# Patient Record
Sex: FEMALE | Race: WHITE | NOT HISPANIC OR LATINO | Employment: UNEMPLOYED | URBAN - METROPOLITAN AREA
[De-identification: names, ages, dates, MRNs, and addresses within clinical notes are randomized per-mention and may not be internally consistent; named-entity substitution may affect disease eponyms.]

---

## 2017-03-29 ENCOUNTER — HOSPITAL ENCOUNTER (INPATIENT)
Facility: HOSPITAL | Age: 82
LOS: 2 days | DRG: 154 | End: 2017-03-31
Attending: EMERGENCY MEDICINE | Admitting: PSYCHIATRY & NEUROLOGY
Payer: MEDICARE

## 2017-03-29 DIAGNOSIS — I63.9 CEREBROVASCULAR ACCIDENT (CVA), UNSPECIFIED MECHANISM: Primary | ICD-10-CM

## 2017-03-29 PROBLEM — N30.00 ACUTE CYSTITIS WITHOUT HEMATURIA: Status: ACTIVE | Noted: 2017-03-29

## 2017-03-29 PROBLEM — E11.22 CONTROLLED TYPE 2 DIABETES MELLITUS WITH STAGE 3 CHRONIC KIDNEY DISEASE: Status: ACTIVE | Noted: 2017-03-29

## 2017-03-29 PROBLEM — I10 HYPERTENSION: Status: ACTIVE | Noted: 2017-03-29

## 2017-03-29 PROBLEM — N18.30 CKD STAGE 3 DUE TO TYPE 1 DIABETES MELLITUS: Status: ACTIVE | Noted: 2017-03-29

## 2017-03-29 PROBLEM — E78.00 HYPERCHOLESTEROLEMIA: Status: ACTIVE | Noted: 2017-03-29

## 2017-03-29 PROBLEM — E03.9 HYPOTHYROIDISM: Status: ACTIVE | Noted: 2017-03-29

## 2017-03-29 PROBLEM — M19.90 OSTEOARTHRITIS: Status: ACTIVE | Noted: 2017-03-29

## 2017-03-29 PROBLEM — N18.30 CONTROLLED TYPE 2 DIABETES MELLITUS WITH STAGE 3 CHRONIC KIDNEY DISEASE: Status: ACTIVE | Noted: 2017-03-29

## 2017-03-29 PROBLEM — E10.22 CKD STAGE 3 DUE TO TYPE 1 DIABETES MELLITUS: Status: ACTIVE | Noted: 2017-03-29

## 2017-03-29 LAB
BACTERIA #/AREA URNS AUTO: ABNORMAL /HPF
BILIRUB UR QL STRIP: NEGATIVE
CHOLEST/HDLC SERPL: 3.6 {RATIO}
CLARITY UR REFRACT.AUTO: ABNORMAL
COLOR UR AUTO: ABNORMAL
DIASTOLIC DYSFUNCTION: YES
ESTIMATED AVG GLUCOSE: 143 MG/DL
ESTIMATED PA SYSTOLIC PRESSURE: 34.36
GLUCOSE UR QL STRIP: NEGATIVE
HBA1C MFR BLD HPLC: 6.6 %
HDL/CHOLESTEROL RATIO: 27.5 %
HDLC SERPL-MCNC: 131 MG/DL
HDLC SERPL-MCNC: 36 MG/DL
HGB UR QL STRIP: ABNORMAL
HYALINE CASTS UR QL AUTO: 0 /LPF
KETONES UR QL STRIP: NEGATIVE
LDLC SERPL CALC-MCNC: 79.4 MG/DL
LEUKOCYTE ESTERASE UR QL STRIP: ABNORMAL
MICROSCOPIC COMMENT: ABNORMAL
NITRITE UR QL STRIP: POSITIVE
NONHDLC SERPL-MCNC: 95 MG/DL
PH UR STRIP: 5 [PH] (ref 5–8)
POCT GLUCOSE: 109 MG/DL (ref 70–110)
POCT GLUCOSE: 139 MG/DL (ref 70–110)
POCT GLUCOSE: 164 MG/DL (ref 70–110)
POCT GLUCOSE: 240 MG/DL (ref 70–110)
PROT UR QL STRIP: ABNORMAL
RBC #/AREA URNS AUTO: 7 /HPF (ref 0–4)
RETIRED EF AND QEF - SEE NOTES: 50 (ref 55–65)
SP GR UR STRIP: 1.01 (ref 1–1.03)
TRICUSPID VALVE REGURGITATION: ABNORMAL
TRIGL SERPL-MCNC: 78 MG/DL
URN SPEC COLLECT METH UR: ABNORMAL
UROBILINOGEN UR STRIP-ACNC: NEGATIVE EU/DL
WBC #/AREA URNS AUTO: >100 /HPF (ref 0–5)
WBC CLUMPS UR QL AUTO: ABNORMAL

## 2017-03-29 PROCEDURE — 87086 URINE CULTURE/COLONY COUNT: CPT

## 2017-03-29 PROCEDURE — 20000000 HC ICU ROOM

## 2017-03-29 PROCEDURE — 27000221 HC OXYGEN, UP TO 24 HOURS

## 2017-03-29 PROCEDURE — 83701 LIPOPROTEIN BLD HR FRACTION: CPT

## 2017-03-29 PROCEDURE — 93306 TTE W/DOPPLER COMPLETE: CPT | Mod: 26,,, | Performed by: INTERNAL MEDICINE

## 2017-03-29 PROCEDURE — 87077 CULTURE AEROBIC IDENTIFY: CPT

## 2017-03-29 PROCEDURE — 96365 THER/PROPH/DIAG IV INF INIT: CPT

## 2017-03-29 PROCEDURE — 25000003 PHARM REV CODE 250: Performed by: NURSE PRACTITIONER

## 2017-03-29 PROCEDURE — 93880 EXTRACRANIAL BILAT STUDY: CPT | Performed by: SURGERY

## 2017-03-29 PROCEDURE — 99222 1ST HOSP IP/OBS MODERATE 55: CPT | Mod: ,,, | Performed by: PSYCHIATRY & NEUROLOGY

## 2017-03-29 PROCEDURE — 80061 LIPID PANEL: CPT

## 2017-03-29 PROCEDURE — 99233 SBSQ HOSP IP/OBS HIGH 50: CPT | Mod: GC,,, | Performed by: PSYCHIATRY & NEUROLOGY

## 2017-03-29 PROCEDURE — 99285 EMERGENCY DEPT VISIT HI MDM: CPT

## 2017-03-29 PROCEDURE — 63600175 PHARM REV CODE 636 W HCPCS: Performed by: PSYCHIATRY & NEUROLOGY

## 2017-03-29 PROCEDURE — 83036 HEMOGLOBIN GLYCOSYLATED A1C: CPT

## 2017-03-29 PROCEDURE — 82962 GLUCOSE BLOOD TEST: CPT

## 2017-03-29 PROCEDURE — 25000003 PHARM REV CODE 250: Performed by: PSYCHIATRY & NEUROLOGY

## 2017-03-29 PROCEDURE — 96361 HYDRATE IV INFUSION ADD-ON: CPT

## 2017-03-29 PROCEDURE — 81001 URINALYSIS AUTO W/SCOPE: CPT

## 2017-03-29 PROCEDURE — 63600175 PHARM REV CODE 636 W HCPCS: Performed by: EMERGENCY MEDICINE

## 2017-03-29 PROCEDURE — 99291 CRITICAL CARE FIRST HOUR: CPT | Mod: ,,, | Performed by: EMERGENCY MEDICINE

## 2017-03-29 PROCEDURE — 87186 SC STD MICRODIL/AGAR DIL: CPT

## 2017-03-29 PROCEDURE — 87088 URINE BACTERIA CULTURE: CPT

## 2017-03-29 PROCEDURE — 93306 TTE W/DOPPLER COMPLETE: CPT

## 2017-03-29 RX ORDER — GUAIFENESIN 100 MG/5ML
200 SOLUTION ORAL 3 TIMES DAILY PRN
Status: DISCONTINUED | OUTPATIENT
Start: 2017-03-29 | End: 2017-03-31 | Stop reason: HOSPADM

## 2017-03-29 RX ORDER — SODIUM CHLORIDE 9 MG/ML
1000 INJECTION, SOLUTION INTRAVENOUS CONTINUOUS
Status: ACTIVE | OUTPATIENT
Start: 2017-03-29 | End: 2017-03-30

## 2017-03-29 RX ORDER — LOPERAMIDE HYDROCHLORIDE 2 MG/1
2 CAPSULE ORAL 4 TIMES DAILY PRN
Status: DISCONTINUED | OUTPATIENT
Start: 2017-03-29 | End: 2017-03-31 | Stop reason: HOSPADM

## 2017-03-29 RX ORDER — ATENOLOL 50 MG/1
50 TABLET ORAL DAILY
Status: DISCONTINUED | OUTPATIENT
Start: 2017-03-29 | End: 2017-03-31 | Stop reason: HOSPADM

## 2017-03-29 RX ORDER — LEVOTHYROXINE SODIUM 75 UG/1
75 TABLET ORAL DAILY
Status: DISCONTINUED | OUTPATIENT
Start: 2017-03-29 | End: 2017-03-31 | Stop reason: HOSPADM

## 2017-03-29 RX ORDER — AMLODIPINE BESYLATE 10 MG/1
10 TABLET ORAL DAILY
Status: DISCONTINUED | OUTPATIENT
Start: 2017-03-29 | End: 2017-03-31 | Stop reason: HOSPADM

## 2017-03-29 RX ORDER — LIDOCAINE HYDROCHLORIDE 10 MG/ML
2.1 INJECTION INFILTRATION; PERINEURAL ONCE
Status: DISCONTINUED | OUTPATIENT
Start: 2017-03-29 | End: 2017-03-31 | Stop reason: HOSPADM

## 2017-03-29 RX ORDER — INSULIN ASPART 100 [IU]/ML
1-10 INJECTION, SOLUTION INTRAVENOUS; SUBCUTANEOUS
Status: DISCONTINUED | OUTPATIENT
Start: 2017-03-29 | End: 2017-03-31 | Stop reason: HOSPADM

## 2017-03-29 RX ORDER — IBUPROFEN 200 MG
16 TABLET ORAL
Status: DISCONTINUED | OUTPATIENT
Start: 2017-03-29 | End: 2017-03-31 | Stop reason: HOSPADM

## 2017-03-29 RX ORDER — IBUPROFEN 200 MG
24 TABLET ORAL
Status: DISCONTINUED | OUTPATIENT
Start: 2017-03-29 | End: 2017-03-31 | Stop reason: HOSPADM

## 2017-03-29 RX ORDER — TRIAMCINOLONE ACETONIDE 1 MG/G
CREAM TOPICAL 2 TIMES DAILY
Status: DISCONTINUED | OUTPATIENT
Start: 2017-03-29 | End: 2017-03-31 | Stop reason: HOSPADM

## 2017-03-29 RX ORDER — PRAVASTATIN SODIUM 20 MG/1
20 TABLET ORAL DAILY
Status: DISCONTINUED | OUTPATIENT
Start: 2017-03-29 | End: 2017-03-31 | Stop reason: HOSPADM

## 2017-03-29 RX ORDER — CEFTRIAXONE 1 G/1
1 INJECTION, POWDER, FOR SOLUTION INTRAMUSCULAR; INTRAVENOUS
Status: DISCONTINUED | OUTPATIENT
Start: 2017-03-30 | End: 2017-03-29

## 2017-03-29 RX ORDER — GLUCAGON 1 MG
1 KIT INJECTION
Status: DISCONTINUED | OUTPATIENT
Start: 2017-03-29 | End: 2017-03-31 | Stop reason: HOSPADM

## 2017-03-29 RX ORDER — CHOLECALCIFEROL (VITAMIN D3) 50 MCG
50000 TABLET ORAL DAILY
Status: DISCONTINUED | OUTPATIENT
Start: 2017-03-29 | End: 2017-03-30

## 2017-03-29 RX ORDER — ONDANSETRON 4 MG/1
4 TABLET, FILM COATED ORAL EVERY 8 HOURS PRN
Status: DISCONTINUED | OUTPATIENT
Start: 2017-03-29 | End: 2017-03-31 | Stop reason: HOSPADM

## 2017-03-29 RX ADMIN — LEVOTHYROXINE SODIUM 75 MCG: 75 TABLET ORAL at 10:03

## 2017-03-29 RX ADMIN — INSULIN ASPART 1 UNITS: 100 INJECTION, SOLUTION INTRAVENOUS; SUBCUTANEOUS at 11:03

## 2017-03-29 RX ADMIN — PRAVASTATIN SODIUM 20 MG: 20 TABLET ORAL at 02:03

## 2017-03-29 RX ADMIN — CEFTRIAXONE 1 G: 1 INJECTION, SOLUTION INTRAVENOUS at 05:03

## 2017-03-29 RX ADMIN — ATENOLOL 50 MG: 50 TABLET ORAL at 10:03

## 2017-03-29 RX ADMIN — SODIUM CHLORIDE 1000 ML: 0.9 INJECTION, SOLUTION INTRAVENOUS at 04:03

## 2017-03-29 RX ADMIN — AMLODIPINE BESYLATE 10 MG: 10 TABLET ORAL at 10:03

## 2017-03-29 NOTE — PLAN OF CARE
Problem: Patient Care Overview  Goal: Individualization & Mutuality  Past Medical History:   Diagnosis Date    Aphasia      Arthritis      Hypertension      Renal disorder      Thyroid disease         Comments:   Extra blanket and keep door closed

## 2017-03-29 NOTE — PLAN OF CARE
Problem: Patient Care Overview  Goal: Plan of Care Review  Outcome: Ongoing (interventions implemented as appropriate)  Plan of care reviewed with pt,all questions and concerns addressed.Will continue to monitor post TPA  Neuro checks and vital signs.

## 2017-03-29 NOTE — PROGRESS NOTES
Pt arrived to [facility] room 3589 Post TPA from ED dept accompanied per nurses via stretcher.Pt alert and oriented x 3,moving all extremeties.Family at bedside.No acute distress noted.

## 2017-03-29 NOTE — IP AVS SNAPSHOT
[facility]  [address]  [facility] [address]  Phone: [phone]           Patient Discharge Instructions   Our goal is to set you up for success. This packet includes information on your condition, medications, and your home care.  It will help you care for yourself to prevent having to return to the hospital.     Please ask your nurse if you have any questions.      There are many details to remember when preparing to leave the hospital. Here is what you will need to do:    1. Take your medicine. If you are prescribed medications, review your Medication List on the following pages. You may have new medications to  at the pharmacy and others that you'll need to stop taking. Review the instructions for how and when to take your medications. Talk with your doctor or nurses if you are unsure of what to do.     2. Go to your follow-up appointments. Specific follow-up information is listed in the following pages. Your may be contacted by a nurse or clinical provider about future appointments. Be sure we have all of the phone numbers to reach you. Please contact your provider's office if you are unable to make an appointment.     3. Watch for warning signs. Your doctor or nurse will give you detailed warning signs to watch for and when to call for assistance. These instructions may also include educational information about your condition. If you experience any of warning signs to your health, call your doctor.           [facility]  Unless otherwise directed by your provider, please   contact [facility], our nurse care line   that is available for 24/7 assistance.     [phone] (toll-free)     Registered nurses in the [facility]   provide: appointment scheduling, clinical advisement, health education, and other advisory services.                  ** Verify the list of medication(s) below is accurate and up to date. Carry this with you in case of  emergency. If your medications have changed, please notify your healthcare provider.             Medication List      START taking these medications        Additional Info                      amoxicillin-clavulanate 500-125mg 500-125 mg Tab   Commonly known as:  AUGMENTIN   Quantity:  10 tablet   Refills:  0   Dose:  1 tablet    Instructions:  Take 1 tablet (500 mg total) by mouth 2 (two) times daily.     Begin Date    AM    Noon    PM    Bedtime       aspirin 81 MG Chew   Refills:  0   Dose:  81 mg    Last time this was given:  81 mg on 3/31/2017  7:25 AM   Instructions:  Take 1 tablet (81 mg total) by mouth once daily.     Begin Date    AM    Noon    PM    Bedtime         CHANGE how you take these medications        Additional Info                      tramadol 50 mg tablet   Commonly known as:  ULTRAM   Quantity:  30 tablet   Refills:  0   Dose:  50 mg   What changed:    - when to take this  - reasons to take this    Instructions:  Take 1 tablet (50 mg total) by mouth 3 (three) times daily as needed for Pain.     Begin Date    AM    Noon    PM    Bedtime         CONTINUE taking these medications        Additional Info                      amlodipine 10 MG tablet   Commonly known as:  NORVASC   Refills:  0   Dose:  10 mg    Last time this was given:  10 mg on 3/31/2017  7:25 AM   Instructions:  Take 10 mg by mouth once daily.     Begin Date    AM    Noon    PM    Bedtime       atenolol 50 MG tablet   Commonly known as:  TENORMIN   Refills:  0   Dose:  50 mg    Last time this was given:  50 mg on 3/31/2017  7:26 AM   Instructions:  Take 50 mg by mouth once daily.     Begin Date    AM    Noon    PM    Bedtime       colestipol 5 gram granules   Commonly known as:  COLESTID   Refills:  0   Dose:  1 g    Instructions:  Take 1 g by mouth once daily.     Begin Date    AM    Noon    PM    Bedtime       glipiZIDE 5 MG Tr24   Commonly known as:  GLUCOTROL   Refills:  0   Dose:  5 mg    Instructions:  Take 5 mg by mouth  daily with breakfast.     Begin Date    AM    Noon    PM    Bedtime       guaifenesin 100 mg/5 ml 100 mg/5 mL syrup   Commonly known as:  ROBITUSSIN   Refills:  0   Dose:  200 mg    Instructions:  Take 200 mg by mouth 3 (three) times daily as needed for Cough.     Begin Date    AM    Noon    PM    Bedtime       insulin lispro 100 unit/mL injection   Commonly known as:  HUMALOG   Refills:  0    Instructions:  Inject into the skin 3 (three) times daily before meals. SLIDING SCALE: < 60 GIVE 1 AMP GLUCAGON AND NOTIFY MD,  NONE, 150-200: 4 UNITS, 201-250: 6 UNITS, 251-300: 8 UNITS, >300: 10 UNITS AND NOTIFY MD     Begin Date    AM    Noon    PM    Bedtime       levothyroxine 75 MCG tablet   Commonly known as:  SYNTHROID   Refills:  0   Dose:  75 mcg    Last time this was given:  75 mcg on 3/31/2017  7:25 AM   Instructions:  Take 75 mcg by mouth once daily.     Begin Date    AM    Noon    PM    Bedtime       loperamide 2 mg capsule   Commonly known as:  IMODIUM   Refills:  0   Dose:  2 mg    Instructions:  Take 2 mg by mouth 4 (four) times daily as needed for Diarrhea.     Begin Date    AM    Noon    PM    Bedtime       ondansetron 4 MG tablet   Commonly known as:  ZOFRAN   Refills:  0   Dose:  4 mg    Instructions:  Take 4 mg by mouth every 8 (eight) hours as needed for Nausea.     Begin Date    AM    Noon    PM    Bedtime       pravastatin 20 MG tablet   Commonly known as:  PRAVACHOL   Refills:  0   Dose:  20 mg    Last time this was given:  20 mg on 3/31/2017  7:25 AM   Instructions:  Take 20 mg by mouth once daily.     Begin Date    AM    Noon    PM    Bedtime       SITagliptan 50 MG Tab   Commonly known as:  JANUVIA   Refills:  0   Dose:  100 mg    Instructions:  Take 100 mg by mouth once daily.     Begin Date    AM    Noon    PM    Bedtime       triamcinolone acetonide 0.1% 0.1 % cream   Commonly known as:  KENALOG   Refills:  0    Last time this was given:  1 oz on 3/31/2017  7:27 AM   Instructions:  Apply  topically 2 (two) times daily.     Begin Date    AM    Noon    PM    Bedtime       VITAMIN D-3 ORAL   Refills:  0   Dose:  90718 Units    Instructions:  Take 50,000 Units by mouth.     Begin Date    AM    Noon    PM    Bedtime            Where to Get Your Medications      You can get these medications from any pharmacy     Bring a paper prescription for each of these medications     amoxicillin-clavulanate 500-125mg 500-125 mg Tab    tramadol 50 mg tablet       You don't need a prescription for these medications     aspirin 81 MG Chew                  Please bring to all follow up appointments:    1. A copy of your discharge instructions.  2. All medicines you are currently taking in their original bottles.  3. Identification and insurance card.    Please arrive 15 minutes ahead of scheduled appointment time.    Please call 24 hours in advance if you must reschedule your appointment and/or time.        Follow-up Information     Follow up with Lonny Andujar MD In 1 week.    Specialty:  Family Medicine    Contact information:    [address] [place] [id number]  [phone]          Discharge Instructions     Future Orders    Activity as tolerated         Primary Diagnosis     Your primary diagnosis was:  Facial Weakness      Admission Information     Date & Time Provider Department CSN    3/29/2017  4:23 AM Lonny Escobedo MD [facility] [id number]      Care Providers     Provider Role Specialty Primary office phone    Lonny Escobedo MD Attending Provider Neurology [phone]    John Tan MD Team Attending  Neuro Critical Care [phone]    Gurwinder Lanza MD Team Attending  Neuro Critical Care [phone]    Jake Jones MD Team Attending  Interventional Neurology [phone]    Lonny Escobedo MD Team Attending  Neurology [phone]      Your Vitals Were     BP Pulse Temp Resp Height Weight    129/61 (BP Location: Right arm,  "Patient Position: Lying, BP Method: Automatic) 74 97.3 °F (36.3 °C) (Oral) 20 5' 2" (1.575 m) 54.4 kg (120 lb)    Last Period SpO2 BMI          (LMP Unknown) 98% 21.95 kg/m2        Recent Lab Values        3/29/2017                           5:49 AM           A1C 6.6 (H)           Comment for A1C at  5:49 AM on 3/29/2017:  According to ADA guidelines, hemoglobin A1C <7.0% represents  optimal control in non-pregnant diabetic patients.  Different  metrics may apply to specific populations.   Standards of Medical Care in Diabetes - 2016.  For the purpose of screening for the presence of diabetes:  <5.7%     Consistent with the absence of diabetes  5.7-6.4%  Consistent with increasing risk for diabetes   (prediabetes)  >or=6.5%  Consistent with diabetes  Currently no consensus exists for use of hemoglobin A1C  for diagnosis of diabetes for children.        Allergies as of 3/31/2017     No Known Allergies      Advance Directives     An advance directive is a document which, in the event you are no longer able to make decisions for yourself, tells your healthcare team what kind of treatment you do or do not want to receive, or who you would like to make those decisions for you.  If you do not currently have an advance directive, [facility] encourages you to create one.  For more information call:  [phone] ([phone]), [phone] ([phone]),  or log on to [email or web address].        Language Assistance Services     ATTENTION: Language assistance services are available, free of charge. Please call [phone].      ATENCIÓN: Si habla español, tiene a wan disposición servicios gratuitos de asistencia lingüística. Llame al [phone].     CHÚ Ý: N?u b?n nói Ti?ng Vi?t, có các d?ch v? h? tr? ngôn ng? mi?n phí dành cho b?n. G?i s? [phone].        Stroke Education              Heart Failure Education       Heart Failure: Being Active  You have a condition called heart failure. Being active doesnt " mean that you have to wear yourself out. Even a little movement each day helps to strengthen your heart. If you cant get out to exercise, you can do simple stretching and strengthening exercises at home. These are good ways to keep you well-conditioned and prevent you and your heart from becoming excessively weak.    Ideas to get you started  · Add a little movement to things you do now. Walk to mail letters. Park your car at the far end of the parking lot and walk to the store. Walk up a flight of stairs instead of taking the elevator.  · Choose activities you enjoy. You might walk, swim, or ride an exercise bike. Things like gardening and washing the car count, too. Other possibilities include: washing dishes, walking the dog, walking around the mall, and doing aerobic activities with friends.  · Join a group exercise program at a [facility] or [facility], a senior center, or a community center. Or look into a hospital cardiac rehabilitation program. Ask your doctor if you qualify.  Tips to keep you going  · Get up and get dressed each day. Go to a coffee shop and read a newspaper or go somewhere that you'll be in the presence of other active people. Youll feel more like being active.  · Make a plan. Choose one or more activities that you enjoy and that you can easily do. Then plan to do at least one each day. You might write your plan on a calendar.  · Go with a friend or a group if you like company. This can help you feel supported and stay motivated, too.  · Plan social events that you enjoy. This will keep you mentally engaged as well as physically motivated to do things you find pleasure in.  For your safety  · Talk with your healthcare provider before starting an exercise program.  · Exercise indoors when its too hot or too cold outside, or when the air quality is poor. Try walking at a shopping mall.  · Wear socks and sturdy shoes to maintain your balance and prevent falls.  · Start slowly. Do a few minutes several  times a day at first. Increase your time and speed little by little.  · Stop and rest whenever you feel tired or get short of breath.  · Dont push yourself on days when you dont feel well.  Date Last Reviewed: 3/20/2016  © 0389-8248 [employer]. [address] [place] [address]. All rights reserved. This information is not intended as a substitute for professional medical care. Always follow your healthcare professional's instructions.              Heart Failure: Evaluating Your Heart  You have a condition called heart failure. To evaluate your condition, your doctor will examine you, ask questions, and do some tests. Along with looking for signs of heart failure, the doctor looks for any other health problems that may have led to heart failure. The results of your evaluation will help your doctor form a treatment plan.  Health history and physical exam  Your visit will start with a health history. Tell the doctor about any symptoms youve noticed and about all medicines you take. Then youll have a physical exam. This includes listening to your heartbeat and breathing. Youll also be checked for swelling (edema) in your legs and neck. When you have fluid buildup or fluid in the lungs, it may be called congestive heart failure.  Diagnosing heart failure     During an echocardiogram, sound waves bounce off the heart. These are converted into a picture on the screen.   The following may be done to help your doctor form a diagnosis:  · X-rays show the size and shape of your heart. These pictures can also show fluid in your lungs.  · An electrocardiogram (ECG or EKG) shows the pattern of your heartbeat. Small pads (electrodes) are placed on your chest, arms, and legs. Wires connect the pads to the ECG machine, which records your hearts electrical signals. This can give the doctor information about heart function.  · An echocardiogram uses ultrasound waves to show the structure and movement of  your heart muscle. This shows how well the heart pumps. It also shows the thickness of the heart walls, and if the heart is enlarged. It is one of the most useful, non-invasive tests as it provides information about the heart's general function. This helps your doctor make treatment decisions.  · Lab tests evaluate small amounts of blood or urine for signs of problems. A BNP lab test can help diagnose and evaluate heart failure. BNP stands for B-type natriuretic peptide. The ventricles secrete more BNP when heart failure worsens. Lab tests can also provide information about metabolic dysfunction or heart dysfunction.  Your treatment plan  Based on the results of your evaluation and tests, your doctor will develop a treatment plan. This plan is designed to relieve some of your heart failure symptoms and help make you more comfortable. Your treatment plan may include:  · Medicine to help your heart work better and improve your quality of life  · Changes in what you eat and drink to help prevent fluid from backing up in your body  · Daily monitoring of your weight and heart failure symptoms to see how well your treatment plan is working  · Exercise to help you stay healthy  · Help with quitting smoking  · Emotional and psychological support to help adjust to the changes  · Referrals to other specialists to make sure you are being treated comprehensively  Date Last Reviewed: 3/21/2016  © 0279-4852 [employer]. [address] [place] [address], [place] [address]. All rights reserved. This information is not intended as a substitute for professional medical care. Always follow your healthcare professional's instructions.              Heart Failure: Making Changes to Your Diet  You have a condition called heart failure. When you have heart failure, excess fluid is more likely to build up in your body because your heart isn't working well. This makes the heart work harder to pump blood. Fluid buildup causes symptoms  such as shortness of breath and swelling (edema). This is often referred to as congestive heart failure or CHF. Controlling the amount of salt (sodium) you eat may help stop fluid from building up. Your doctor may also tell you to reduce the amount of fluid you drink.  Reading food labels    Your healthcare provider will tell you how much sodium you can eat each day. Read food labels to keep track. Keep in mind that certain foods are high in salt. These include canned, frozen, and processed foods. Check the amount of sodium in each serving. Watch out for high-sodium ingredients. These include MSG (monosodium glutamate), baking soda, and sodium phosphate.   Eating less salt  Give yourself time to get used to eating less salt. It may take a little while. Here are some tips to help:  · Take the saltshaker off the table. Replace it with salt-free herb mixes and spices.  · Eat fresh or plain frozen vegetables. These have much less salt than canned vegetables.  · Choose low-sodium snacks like sodium-free pretzels, crackers, or air-popped popcorn.  · Dont add salt to your food when youre cooking. Instead, season your foods with pepper, lemon, garlic, or onion.  · When you eat out, ask that your food be cooked without added salt.  · Avoid eating fried foods as these often have a great deal of salt.  If youre told to limit fluids  You may need to limit how much fluid you have to help prevent swelling. This includes anything that is liquid at room temperature, such as ice cream and soup. If your doctor tells you to limit fluid, try these tips:  · Measure drinks in a measuring cup before you drink them. This will help you meet daily goals.  · Chill drinks to make them more refreshing.  · Suck on frozen lemon wedges to quench thirst.  · Only drink when youre thirsty.  · Chew sugarless gum or suck on hard candy to keep your mouth moist.  · Weigh yourself daily to know if your body's fluid content is rising.  My sodium  goal  Your healthcare provider may give you a sodium goal to meet each day. This includes sodium found in food as well as salt that you add. My goal is to eat no more than ___________ mg of sodium per day.     When to call your doctor  Call your doctor right away if you have any symptoms of worsening heart failure. These can include:  · Sudden weight gain  · Increased swelling of your legs or ankles  · Trouble breathing when youre resting or at night  · Increase in the number of pillows you have to sleep on  · Chest pain, pressure, discomfort, or pain in the jaw, neck, or back   Date Last Reviewed: 3/21/2016  © 7348-6305 [employer]. [address] [place] [address], [place] [address]. All rights reserved. This information is not intended as a substitute for professional medical care. Always follow your healthcare professional's instructions.              Heart Failure: Medicines to Help Your Heart    You have a condition called heart failure (also known as congestive heart failure, or CHF). Your doctor will likely prescribe medicines for heart failure and any underlying health problems you have. Most heart failure patients take one or more types of medicinen. Your healthcare provider will work to find the combination of medicines that works best for you.  Heart failure medicines  Here are the most common heart failure medicines:  · ACE inhibitors lower blood pressure and decrease strain on the heart. This makes it easier for the heart to pump. Angiotensin receptor blockers have similar effects. These are prescribed for some patients instead of ACE inhibitors.  · Beta-blockers relieve stress on the heart. They also improve symptoms. They may also improve the heart's pumping action over time.  · Diuretics (also called water pills) help rid your body of excess water. This can help rid your body of swelling (edema). Having less fluid to pump means your heart doesnt have to work as hard. Some diuretics make your  body lose a mineral called potassium. Your doctor will tell you if you need to take supplements or eat more foods high in potassium.  · Digoxin helps your heart pump with more strength. This helps your heart pump more blood with each beat. So, more oxygen-rich blood travels to the rest of the body.  · Aldosterone antagonists help alter hormones and decrease strain on the heart.  · Hydralazine and nitrates are two separate medicines used together to treat heart failure. They may come in one combination pill. They lower blood pressure and decrease how hard the heart has to pump.  Medicines for related conditions  Controlling other heart problems helps keep heart failure under control, too. Depending on other heart problems you have, medicines may be prescribed to:  · Lower blood pressure (antihypertensives).  · Lower cholesterol levels (statins).  · Prevent blood clots (anticoagulants or aspirin).  · Keep the heartbeat steady (antiarrhythmics).  Date Last Reviewed: 3/5/2016  © 1093-2822 [employer]. [address] [place] [address], [place] [address]. All rights reserved. This information is not intended as a substitute for professional medical care. Always follow your healthcare professional's instructions.              Heart Failure: Procedures That May Help    The heart is a muscle that pumps oxygen-rich blood to all parts of the body. When you have heart failure, the heart is not able to pump as well as it should. Blood and fluid may back up into the lungs (congestive heart failure), and some parts of the body dont get enough oxygen-rich blood to work normally. These problems lead to the symptoms of heart failure.     Certain procedures may help the heart pump better in some cases of heart failure. Some procedures are done to treat health problems that may have caused the heart failure such as coronary artery disease or heart rhythm problems. For more serious heart failure, other options are  available.  Treating artery and valve problems  If you have coronary artery disease or valve disease, procedures may be done to improve blood flow. This helps the heart pump better, which can improve heart failure symptoms. First, your doctor may do a cardiac catheterization to help detect clogged blood vessels or valve damage. During this procedure, a  thin tube (catheter) in inserted into a blood vessel and guided to the heart. There a dye is injected and a special type of X-ray (angiogram) is taken of the blood vessels. Procedures to open a blocked artery or fix damaged valves can also be done using catheterization.  · Angioplasty uses a balloon-tipped instrument at the end of the catheter. The balloon is inflated to widen the narrowed artery. In many cases, a stent is expanded to further support the narrowed artery. A stent is a metal mesh tube.  · Valve surgery repairs or replacement of faulty valves can also be done during catheterization so blood can flow properly through the chambers of the heart.  Bypass surgery is another option to help treat blocked arteries. It uses a healthy blood vessel from elsewhere in the body. The healthy blood vessel is attached above and below the blocked area so that blood can flow around the blocked artery.  Treating heart rhythm problems  A device may be placed in the chest to help a weak heart maintain a healthy, heartbeat so the heart can pump more effectively:  · Pacemaker. A pacemaker is an implanted device that regulates your heartbeat electronically. It monitors your heart's rhythm and generates a painless electric impulse that helps the heart beat in a regular rhythm. A pacemaker is programmed to meet your specific heart rhythm needs.  · Biventricular pacing/cardiac resynchronization therapy. A type of pacemaker that paces both pumping chambers of the heart at the same time to coordinate contractions and to improve the heart's function. Some people with heart failure  are candidates for this therapy.  · Implantable cardioverter defibrillator. A device similar to a pacemaker that senses when the heart is beating too fast and delivers an electrical shock to convert the fast rhythm to a normal rhythm. This can be a life saving device.  In severe cases  In more serious cases of heart failure when other treatments no longer work, other options may include:  · Ventricular assist devices (VADs). These are mechanical devices used to take over the pumping function for one or both of the heart's ventricles, or pumping chambers. A VAD may be necessary when heart failure progresses to the point that medicines and other treatments no longer help. In some cases, a VAD may be used as a bridge to transplant.  · Heart transplant. This is replacing the diseased heart with a healthy one from a donor. This is an option for a few people who are very sick. A heart transplant is very serious and not an option for all patients. Your doctor can tell you more.  Date Last Reviewed: 3/20/2016  © 2969-4308 [employer]. [address] [place] [address]. All rights reserved. This information is not intended as a substitute for professional medical care. Always follow your healthcare professional's instructions.              Heart Failure: Tracking Your Weight  You have a condition called heart failure. When you have heart failure, a sudden weight gain or a steady rise in weight is a warning sign that your body is retaining too much water and salt. This could mean your heart failure is getting worse. If left untreated, it can cause problems for your lungs and result in shortness of breath. Weighing yourself each day is the best way to know if youre retaining water. If your weight goes up quickly, call your doctor. You will be given instructions on how to get rid of the excess water. You will likely need medicines and to avoid salt. This will help your heart work better.  Call your doctor  if you gain more than 2 pounds in 1 day, more than 5 pounds in 1 week, or whatever weight gain you were told to report by your doctor. This is often a sign of worsening heart failure and needs to be evaluated and treated. Your doctor will tell you what to do next.   Tips for weighing yourself    · Weigh yourself at the same time each morning, wearing the same clothes. Weigh yourself after urinating and before eating.  · Use the same scale each day. Make sure the numbers are easy to read. Put the scale on a flat, hard surface -- not on a rug or carpet.  · Do not stop weighing yourself. If you forget one day, weigh again the next morning.  How to use your weight chart  · Keep your weight chart near the scale. Write your weight on the chart as soon as you get off the scale.  · Fill in the month and the start date on the chart. Then write down your weight each day. Your chart will look like this:    · If you miss a day, leave the space blank. Weigh yourself the next day and write your weight in the next space.  · Take your weight chart with you when you go to see your doctor.  Date Last Reviewed: 3/20/2016  © 0773-9780 [employer]. [address] [place] [address], [place] [address]. All rights reserved. This information is not intended as a substitute for professional medical care. Always follow your healthcare professional's instructions.              Heart Failure: Warning Signs of a Flare-Up  You have a condition called heart failure. Once you have heart failure, flare-ups can happen. Below are signs that can mean your heart failure is getting worse. If you notice any of these warning signs, call your healthcare provider.  Swelling    · Your feet, ankles, or lower legs get puffier.  · You notice skin changes on your lower legs.  · Your shoes feel too tight.  · Your clothes are tighter in the waist.  · You have trouble getting rings on or off your fingers.  Shortness of breath  · You have to breathe harder even  when youre doing your normal activities or when youre resting.  · You are short of breath walking up stairs or even short distances.  · You wake up at night short of breath or coughing.  · You need to use more pillows or sit up to sleep.  · You wake up tired or restless.  Other warning signs  · You feel weaker, dizzy, or more tired.  · You have chest pain or changes in your heartbeat.  · You have a cough that wont go away.  · You cant remember things or dont feel like eating.  Tracking your weight  Gaining weight is often the first warning sign that heart failure is getting worse. Gaining even a few pounds can be a sign that your body is retaining excess water and salt. Weighing yourself each day in the morning after you urinate and before you eat, is the best way to know if you're retaining water. Get a scale that is easy to read and make sure you wear the same clothes and use the same scale every time you weigh. Your healthcare provider will show you how to track your weight. Call your doctor if you gain more than 2 pounds in 1 day, 5 pounds in 1 week, or whatever weight gain you were told to report by your doctor. This is often a sign of worsening heart failure and needs to be evaluated and treated before it compromises your breathing. Your doctor will tell you what to do next.    Date Last Reviewed: 3/15/2016  © 3877-2502 [employer]. [address] [place] [address]. All rights reserved. This information is not intended as a substitute for professional medical care. Always follow your healthcare professional's instructions.              Chronic Kindey Disease Education             Diabetes Discharge Instructions                                   [facility] Sign-Up     Activating your [facility] account is as easy as 1-2-3!     1) Visit [email or web address], select Sign Up Now, enter this activation code and your date of birth, then select Next.  [email or web address]  Expires: 5/15/2017 12:50 PM       2) Create a username and password to use when you visit [facility] in the future and select a security question in case you lose your password and select Next.    3) Enter your e-mail address and click Sign Up!    Additional Information  If you have questions, please e-mail [email or web address] or call [phone] to talk to our [facility] staff. Remember, [facility] is NOT to be used for urgent needs. For medical emergencies, dial 911.          [facility] complies with applicable Federal civil rights laws and does not discriminate on the basis of race, color, national origin, age, disability, or sex.

## 2017-03-29 NOTE — H&P
[facility]  History & Physical  Neurological ICU    SUBJECTIVE:     Chief Complaint/Reason for Admission: facial weakness, s/p tpa    History of Present Illness: 89 y.o. Nursing home patient who developed left facial weakness and slurred speech about an hour prior to arrival at a local ed  Patient was given tpa and sent to [facility]      (Not in a hospital admission)    Review of patient's allergies indicates:  No Known Allergies    Past Medical History:   Diagnosis Date    Aphasia     Arthritis     Hypertension     Renal disorder     Thyroid disease      No past surgical history on file.  No family history on file.  Social History   Substance Use Topics    Smoking status: Never Smoker    Smokeless tobacco: None    Alcohol use No        Review of Systems:  Review of systems not obtained due to patient factors dementia.    OBJECTIVE:     Vital Signs (Most Recent):  Temp: 98 °F (36.7 °C) (03/29/17 0421)  Pulse: 90 (03/29/17 0438)  Resp: 16 (03/29/17 0438)  BP: (!) 143/63 (03/29/17 0438)  SpO2: 100 % (03/29/17 0438)  Ventilator Data (Last 24H):          Hemodynamic Parameters (Last 24H):       Physical Exam:  General: appears stated age  Lungs:  clear to auscultation bilaterally  Heart: regular rate and rhythm  Abdomen: soft, non-tender non-distented; bowel sounds normal; no masses,  no organomegaly  Pulses: 2+ peripheral pulses  Neurologic: Mental status: alertness: alert, speech dysarthric possibly due to lower jaw edentulous  Cranial nerves: normal, left lower face asymmetry  Motor:grossly normal    Lines/Drains:       Peripheral IV - Single Lumen 03/29/17 0154 Left Wrist (Active)   Site Assessment Clean;Dry;Intact 3/29/2017  1:54 AM   Number of days:0            Peripheral IV - Single Lumen 03/29/17 0158 Right Wrist (Active)   Site Assessment Clean;Dry;Intact 3/29/2017  1:58 AM   Line Status Blood return noted;Flushed 3/29/2017  1:58 AM   Number of days:0            Urethral  Catheter 03/29/17 0252 16 Fr. (Active)   Number of days:0       Laboratory:  CBC:   Recent Labs  Lab 03/29/17 0203   WBC 12.50   RBC 3.17*   HGB 9.6*   HCT 29.3*      MCV 92   MCH 30.2   MCHC 32.7     BMP:   Recent Labs  Lab 03/29/17 0203   *      K 4.7      CO2 23   BUN 47*   CREATININE 2.10*   CALCIUM 9.2     CMP:   Recent Labs  Lab 03/29/17 0203   *   CALCIUM 9.2   ALBUMIN 3.7   PROT 7.1      K 4.7   CO2 23      BUN 47*   CREATININE 2.10*   ALKPHOS 83   ALT 18   AST 16   BILITOT 0.5     LFTs:   Recent Labs  Lab 03/29/17 0203   ALT 18   AST 16   ALKPHOS 83   BILITOT 0.5   PROT 7.1   ALBUMIN 3.7       Chest X-Ray:     Diagnostic Results:  CT: head reviewed    ASSESSMENT/PLAN:         Plan:  Patient with possible small vessel event post tpa  Sbp< 180  q1 hr neuro check/bp check  Restart home bp meds if passes [place], start statin  Sliding scale then add scheduled regimen as po improves  Stroke workup    Critical Care Time greater than: initial visit 2

## 2017-03-29 NOTE — CONSULTS
[facility]  Vascular Neurology  Comprehensive Stroke Center  Consult Note      Inpatient consult to Vascular (Stroke) Neurology  Consult performed by: VENKATESH SCHERER  Consult ordered by: JETT ENG  Reason for consult: facial droop, dysarthria        Subjective:     History of Present Illness:  89 y/o female who resides at [facility] in [place] started with left facial droop and jaw pain and dysarthria. She was taken to [facility] and with no resolution of symptoms  Telemedicine call with Dr Jewell was done and with neg CT scan recommendation was for IVtPA to be given which is was and patient was transferred to [facility].   Upon arrival patient still with dysarthria and left facial droop and pain to jaw area. Patient states that she has bottom dentures but did not put them in as it hurt too much and left face was full.   She denies any extremity weakness, sensory deficit, visual deficit.   Risk factors HTN, HLP, DM,            Past Medical History:   Diagnosis Date    Aphasia     Arthritis     Hypertension     Renal disorder     Thyroid disease      No past surgical history on file.  No family history on file.  Social History   Substance Use Topics    Smoking status: Never Smoker    Smokeless tobacco: None    Alcohol use No     Review of patient's allergies indicates:  No Known Allergies  Medications: I have reviewed the current medication administration record.      (Not in a hospital admission)    Review of Systems   Constitutional: Negative for chills and fever.   HENT: Negative for ear discharge and ear pain.         Haw pain   Eyes: Negative for pain and redness.   Respiratory: Negative for cough and shortness of breath.    Gastrointestinal: Negative for abdominal distention and constipation.   Endocrine: Positive for cold intolerance. Negative for polyuria.   Genitourinary: Negative for difficulty urinating and dysuria.   Musculoskeletal: Positive  for arthralgias. Negative for back pain.   Skin: Negative for rash and wound.        Dry skin   Neurological: Positive for facial asymmetry and speech difficulty.   Psychiatric/Behavioral: Negative for agitation and confusion.     Objective:     Vital Signs (Most Recent):  Temp: 98 °F (36.7 °C) (03/29/17 0421)  Pulse: 90 (03/29/17 0438)  Resp: 16 (03/29/17 0438)  BP: (!) 143/63 (03/29/17 0438)  SpO2: 100 % (03/29/17 0438)    Vital Signs Range (Last 24H):  Temp:  [98 °F (36.7 °C)]   Pulse:  [86-92]   Resp:  [16-20]   BP: (143-180)/(63-83)   SpO2:  [96 %-100 %]     Physical Exam   Constitutional: She is oriented to person, place, and time. She appears well-developed and well-nourished.   HENT:   Head: Normocephalic and atraumatic.   Missing bottom dentures   Eyes: EOM are normal. Pupils are equal, round, and reactive to light.   Neck: Normal range of motion.   Cardiovascular: Normal rate.    Pulmonary/Chest: Effort normal.   Abdominal: Soft.   Genitourinary:   Genitourinary Comments: Cloud urine in catheter   Musculoskeletal: Normal range of motion.   Neurological: She is alert and oriented to person, place, and time. GCS eye subscore is 4 - spontaneous. GCS verbal subscore is 5 - oriented. GCS motor subscore is 6 - obeys commands.   Skin: Skin is warm and dry.   Tenting of skin       Neurological Exam:   LOC: alert and follows requests  Language: No aphasia  Speech: Dysarthria  Orientation: Person, Place, Time  Memory: Recent memory intact, Remote memory intact, Age correct, Month correct  Visual Fields (CN II): Full  EOM (CN III, IV, VI): Full/intact  Oculocephalics: normal  Pupils (CN III, IV, VI): PERRL  Facial Sensation (CN V): Symmetric, Corneal reflex intact  Facial Movement (CN VII): lower weakness left lower  Hearing (CN VIII): intact bilaterally  Gag Reflex (CN IX, X): normal/symmetric  Shoulder/Neck (CN XI): SCM-Left: Normal ; SCM-Right: Normal ; Shoulder Shrug: Normal/Symetric  Tongue (CN XII): to  midline  Reflexes: flexor plantar responses bilaterally  Motor*: Arm Left:  Normal (5/5), Leg Left:   Normal (5/5), Arm Right:   Normal (5/5), Leg Right:   Normal (5/5)  Cerebellar*: Not testable due to laying on stretcher post tpa  Sensation: intact to light touch, temperature and vibration  Tone: Arm-Left: normal; Leg-Left: normal; Arm-Right: normal; Leg-Right: normal    Stroke Team Times:   Date and Time Taken: 3/29/2017 5:19 AM  Last Known Normal Date and Time : 3/29/801338:00  Symptom Onset Date and Time:3/29/813357:00  Stroke Team Called Date and Time: 3/29/072970:31  Stroke Team Arrived Date and Time: 3/29/2017  CT Interpretation Time: :  Intervention Time: 02:53 (TPA)  NIH Stroke Scale:  Interval: 1 hour post tPA or endovascular procedure completion  Level of Consciousness: 0 - alert  LOC Questions: 0 - answers both correctly  LOC Commands: 0 - performs both correctly  Best Gaze: 0 - normal  Visual: 0 - no visual loss  Facial Palsy: 1 - minor  Motor Left Arm: 0 - no drift  Motor Right Arm: 0 - no drift  Motor Left Le - no drift  Motor Right Le - no drift  Limb Ataxia: 0 - absent  Sensory: 0 - normal  Best Language: 0 - no aphasia  Dysarthria: 1 - mild to moderate dysarthria  Extinction and Inattention: 0 - no neglect  NIH Stroke Scale Total: 2  [place] Coma Scale:  Best Eye Response: 4 - spontaneous  Best Motor Response: 6 - obeys commands  Best Verbal Response: 5 - oriented  [place] Coma Scale Total: 15  Modified [place] Scale:   Timeline: Prior to symptoms onset  Modified Elizabeth Score: 2 - slight disability        Laboratory:  CMP:   Recent Labs  Lab 17  0203   CALCIUM 9.2   ALBUMIN 3.7   PROT 7.1      K 4.7   CO2 23      BUN 47*   CREATININE 2.10*   ALKPHOS 83   ALT 18   AST 16   BILITOT 0.5     CBC:   Recent Labs  Lab 17  0203   WBC 12.50   RBC 3.17*   HGB 9.6*   HCT 29.3*      MCV 92   MCH 30.2   MCHC 32.7     Lipid Panel: No results for input(s): CHOL, LDLCALC,  HDL, TRIG in the last 168 hours.  Coagulation:   Recent Labs  Lab 03/29/17  0203   INR 1.0     Platelet Aggregation Study: No results for input(s): PLTAGG, PLTAGINTERP, PLTAGREGLACO, ADPPLTAGGREG in the last 168 hours.  Hgb A1C: No results for input(s): HGBA1C in the last 168 hours.  TSH: No results for input(s): TSH in the last 168 hours.     Recent Labs  Lab 03/29/17  0443   COLORU [place]   SPECGRAV 1.010   PHUR 5.0   PROTEINUA 1+*   BACTERIA Many*         Diagnostic Results:  Brain Imaging:  CT head w/o contrast 3-29-17 results:  No hemmorhage. No mass effect. No early infarct signs    Cardiac Evaluation:  EKG/Telemetry:    Assessment/Plan:     89 y/o female with left facial droop, dysarthira and jaw pain. Received IVtPA at [facility]. 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 Yes. Recieved at [place]      Interventional Revascularization Candidate?  No; No significant neurological deficit    Research Candidate? No      Shaneka Valencia NP  [facility]  Department of Vascular Neurology   [facility]

## 2017-03-29 NOTE — ED TRIAGE NOTES
89 year old female presents to the ED for neurology evaluation after sudden onset facial droop and slurred speech. Patient received [facility] and was flown here for stroke evaluation. Patient is a resident of [facility] in [place]

## 2017-03-29 NOTE — ED PROVIDER NOTES
Encounter Date: 3/29/2017    SCRIBE #1 NOTE: I, Su Rothman, am scribing for, and in the presence of,  Dr. Mendoza. I have scribed the entire note.       History     Chief Complaint   Patient presents with    Cerebrovascular Accident     Review of patient's allergies indicates:  No Known Allergies  HPI Comments: Time patient was seen by the provider: 4:22 AM      The patient is a 89 y.o. female that presents to the ED in transfer after presenting to outside facility with left facial weakness and speech difficulty which began at approximately 1:10 AM. No focal extremity weakness or paresthesias. Telemedicine consult obtained and decision to administer tPA was made. Patient received tPA prior to transfer to [facility] for definitive neurologic evaluation and management. There are no known co-morbidities at this time.     The history is provided by the patient, the EMS personnel and medical records.     Past Medical History:   Diagnosis Date    Aphasia     Arthritis     Hypertension     Renal disorder     Thyroid disease      History reviewed. No pertinent surgical history.  History reviewed. No pertinent family history.  Social History   Substance Use Topics    Smoking status: Never Smoker    Smokeless tobacco: None    Alcohol use No     Review of Systems   Constitutional: Negative for chills.   HENT: Negative for congestion.    Eyes: Negative for redness.   Respiratory: Negative for cough and shortness of breath.    Cardiovascular: Negative for chest pain.   Gastrointestinal: Negative for abdominal pain and vomiting.   Genitourinary: Negative for difficulty urinating and dysuria.   Musculoskeletal: Negative for back pain and neck pain.   Skin: Negative for rash.   Neurological: Positive for facial asymmetry (left droop), speech difficulty and weakness (left facial).       Physical Exam   Initial Vitals   BP Pulse Resp Temp SpO2   -- -- -- -- --            Physical Exam    Nursing note and vitals  reviewed.  Constitutional:   Weak-appearing.    HENT:   Head: Normocephalic and atraumatic.   Eyes: EOM are normal.   Neck: Normal range of motion. Neck supple.   Cardiovascular: Normal rate, regular rhythm, normal heart sounds and intact distal pulses.   Pulmonary/Chest: Breath sounds normal. No stridor. No respiratory distress. She has no wheezes. She has no rhonchi. She has no rales.   Abdominal: Soft. There is no tenderness. There is no rebound and no guarding.   Musculoskeletal: Normal range of motion.   Neurological:   Awake, alert and following commands. Slurring of speech but interacts appropriately. Left facial droop. Some dysarthria. Moving all extremities. Nonfocal extremity exam. No sensory deficits.    Skin: Skin is warm and dry.   Psychiatric: She has a normal mood and affect. Her behavior is normal. Judgment and thought content normal.         ED Course   Procedures  Labs Reviewed   URINALYSIS - Abnormal; Notable for the following:        Result Value    Appearance, UA Cloudy (*)     Protein, UA 1+ (*)     Occult Blood UA 1+ (*)     Nitrite, UA Positive (*)     Leukocytes, UA 3+ (*)     All other components within normal limits   HEMOGLOBIN A1C - Abnormal; Notable for the following:     Hemoglobin A1C 6.6 (*)     Estimated Avg Glucose 143 (*)     All other components within normal limits   URINALYSIS MICROSCOPIC - Abnormal; Notable for the following:     RBC, UA 7 (*)     WBC, UA >100 (*)     Bacteria, UA Many (*)     All other components within normal limits   POCT GLUCOSE - Abnormal; Notable for the following:     POCT Glucose 240 (*)     All other components within normal limits   CULTURE, URINE   POCT GLUCOSE MONITORING CONTINUOUS             Medical Decision Making:   History:   Old Medical Records: I decided to obtain old medical records.  Old Records Summarized: records from another hospital.       <> Summary of Records: CT head at outside facility revealed no acute process. Labs revealed some  ARA, mild anemia, and normal coags.      Initial Assessment:   89 y.o. Female presents with symptoms concerning for acute CVA. tPA given after telemedicine consult was informed. Transferred for definitive neurology evaluation and management. IV access confirmed upon arrival. Placed on monitor. Neuro ICU and vascular neurology consulted emergently. All labs and imaging reviewed. Patient with critical diagnosis. Will be reassess numerous time throughout ED course. Will admit to the neuro ICU.   Clinical Tests:   Lab Tests: Reviewed  Radiological Study: Reviewed  Other:   I have discussed this case with another health care provider.            Scribe Attestation:   Scribe #1: I performed the above scribed service and the documentation accurately describes the services I performed. I attest to the accuracy of the note.    Attending Attestation:         Attending Critical Care:   Critical Care Times:   ==============================================================  · Total Critical Care Time - exclusive of procedural time: 30 minutes.  ==============================================================  Critical care was necessary to treat or prevent imminent or life-threatening deterioration of the following conditions: CNS failure.     Physician Attestation for Scribe:  Physician Attestation Statement for Scribe #1: I, Dr. Mendoza, reviewed documentation, as scribed by Su Rothman in my presence, and it is both accurate and complete.                 ED Course     Clinical Impression:   The encounter diagnosis was Cerebrovascular accident (CVA), unspecified mechanism.    Disposition:   Disposition: Admitted       Luis Enrique Mendoza MD  03/30/17 0600

## 2017-03-30 PROBLEM — I63.9 CEREBROVASCULAR ACCIDENT (CVA): Status: ACTIVE | Noted: 2017-03-30

## 2017-03-30 LAB
POCT GLUCOSE: 107 MG/DL (ref 70–110)
POCT GLUCOSE: 113 MG/DL (ref 70–110)
POCT GLUCOSE: 176 MG/DL (ref 70–110)
POCT GLUCOSE: 68 MG/DL (ref 70–110)

## 2017-03-30 PROCEDURE — 63600175 PHARM REV CODE 636 W HCPCS: Performed by: PSYCHIATRY & NEUROLOGY

## 2017-03-30 PROCEDURE — 92610 EVALUATE SWALLOWING FUNCTION: CPT

## 2017-03-30 PROCEDURE — 99233 SBSQ HOSP IP/OBS HIGH 50: CPT | Mod: ,,, | Performed by: PHYSICIAN ASSISTANT

## 2017-03-30 PROCEDURE — 25000003 PHARM REV CODE 250: Performed by: PHYSICIAN ASSISTANT

## 2017-03-30 PROCEDURE — 25000003 PHARM REV CODE 250: Performed by: PSYCHIATRY & NEUROLOGY

## 2017-03-30 PROCEDURE — 20600001 HC STEP DOWN PRIVATE ROOM

## 2017-03-30 PROCEDURE — G8997 SWALLOW GOAL STATUS: HCPCS | Mod: CJ

## 2017-03-30 PROCEDURE — G8996 SWALLOW CURRENT STATUS: HCPCS | Mod: CJ

## 2017-03-30 PROCEDURE — 99233 SBSQ HOSP IP/OBS HIGH 50: CPT | Mod: GC,,, | Performed by: PSYCHIATRY & NEUROLOGY

## 2017-03-30 RX ORDER — HEPARIN SODIUM 5000 [USP'U]/ML
5000 INJECTION, SOLUTION INTRAVENOUS; SUBCUTANEOUS EVERY 8 HOURS
Status: DISCONTINUED | OUTPATIENT
Start: 2017-03-30 | End: 2017-03-31 | Stop reason: HOSPADM

## 2017-03-30 RX ORDER — NAPROXEN SODIUM 220 MG/1
81 TABLET, FILM COATED ORAL DAILY
Status: DISCONTINUED | OUTPATIENT
Start: 2017-03-30 | End: 2017-03-31 | Stop reason: HOSPADM

## 2017-03-30 RX ADMIN — CHOLECALCIFEROL TAB 125 MCG (5000 UNIT) 50000 UNITS: 125 TAB at 08:03

## 2017-03-30 RX ADMIN — LEVOTHYROXINE SODIUM 75 MCG: 75 TABLET ORAL at 08:03

## 2017-03-30 RX ADMIN — HEPARIN SODIUM 5000 UNITS: 5000 INJECTION, SOLUTION INTRAVENOUS; SUBCUTANEOUS at 10:03

## 2017-03-30 RX ADMIN — CEFTRIAXONE 1 G: 1 INJECTION, SOLUTION INTRAVENOUS at 05:03

## 2017-03-30 RX ADMIN — PRAVASTATIN SODIUM 20 MG: 20 TABLET ORAL at 08:03

## 2017-03-30 RX ADMIN — TRIAMCINOLONE ACETONIDE: 1 CREAM TOPICAL at 11:03

## 2017-03-30 RX ADMIN — INSULIN ASPART 2 UNITS: 100 INJECTION, SOLUTION INTRAVENOUS; SUBCUTANEOUS at 11:03

## 2017-03-30 RX ADMIN — AMLODIPINE BESYLATE 10 MG: 10 TABLET ORAL at 08:03

## 2017-03-30 RX ADMIN — ATENOLOL 50 MG: 50 TABLET ORAL at 08:03

## 2017-03-30 RX ADMIN — ASPIRIN 81 MG CHEWABLE TABLET 81 MG: 81 TABLET CHEWABLE at 11:03

## 2017-03-30 RX ADMIN — HEPARIN SODIUM 5000 UNITS: 5000 INJECTION, SOLUTION INTRAVENOUS; SUBCUTANEOUS at 11:03

## 2017-03-30 RX ADMIN — DEXTROSE MONOHYDRATE 12.5 G: 25 INJECTION, SOLUTION INTRAVENOUS at 05:03

## 2017-03-30 NOTE — PLAN OF CARE
Problem: Patient Care Overview  Goal: Plan of Care Review  Outcome: Ongoing (interventions implemented as appropriate)  Pt transferred from ICU, Blood sugar was 68 drank some apple juice came up to 113.

## 2017-03-30 NOTE — PT/OT/SLP EVAL
"Speech Language Pathology  Evaluation    Myah Apodaca   MRN: [id number]   Admitting Diagnosis: Facial droop    Diet recommendations: Solid Diet Level: Dental Soft  Liquid Diet Level: Thin Feed only when awake/alert, HOB to 90 degrees, Small bites/sips, Alternating bites/sips, 1 bite/sip at a time, Meds crushed in puree, Meds whole 1 at a time, Avoid talking while eating and Assistance with meals    SLP Treatment Date: 17  Speech Start Time: 751     Speech Stop Time: 08     Speech Total (min): 18 min       TREATMENT BILLABLE MINUTES:  Eval Swallow and Oral Function 18    Diagnosis: Facial droop  S/p tpa    Past Medical History:   Diagnosis Date    Aphasia     Arthritis     Hypertension     Renal disorder     Thyroid disease      History reviewed. No pertinent surgical history.    Has the patient been evaluated by SLP for swallowing? : Yes  Keep patient NPO?: No   General Precautions: Standard, aspiration, fall          Social Hx: Patient lives in NH for last 2 years per pt report.    Prior diet: soft/thin, no rice, minimal meat.    Subjective:  Pt alert, cooperative  Patient goals: to decrease pain to L cheek.    Pain Ratin/10 (at rest, pain to L jaw reported intermittently, pt described pain as "sore" no number provided)              Pain Rating Post-Intervention: 0/10    Objective:   Patient found with: peripheral IV, mayers catheter, pulse ox (continuous), telemetry, SCD, blood pressure cuff    Oral Musculature Evaluation  Dentition: scattered dentition (upper dentures in place, lower dentures not present)  Mucosal Quality: adequate  Oral Labial Strength and Mobility: impaired retraction, functional retraction  Lingual Strength and Mobility: WFL  Buccal Strength and Mobility:  (mild droop on L)  Volitional Cough: WFL  Volitional Swallow: WFL  Voice Prior to PO Intake: clear  Oral Musculature Comments: mild jaw discomfort when swallowing     Bedside Swallow Eval:  Consistencies Assessed: thin, " puree, solid  Oral Phase: slow a-p transit/increased mastication  Pharyngeal Phase: coughing/choking with initial tspn of thin only  Pt reports jaw/cheek pain with mastication.  Some increased time noted for a-p transit of puree and solids though appeared 2/2 discomfort, not motor impairment.  Swallow response appeared timely with adequate hyolaryngeal rise palpated.  Cough noted on 1st tspn of thin though additional 4 tspns, 3 cup sips, and 3 straw sips provided with no overt s/s aspiration.  Pt with questionable vision with decreased preparedness for po trials.  Verbal cues given re: presentations following 1st trial with improved tolerance.      Additional Treatment:  Education on role of SLP, POC, diet recs and swallow precs provided.  Pt verbalized understanding.  White board updated and pt's nurse alerted re: results and recs.     Assessment:  Myah Apodaca is a 89 y.o. female with a medical diagnosis of Facial droop and presents with increased risk of dysphagia.    Do you have any cultural, spiritual, Sabianism conflicts, given your current situation?: none reported     Discharge recommendations: Discharge Facility/Level Of Care Needs:  (return to previous NH)     Goals:   SLP Goals        Problem: SLP Goal    Goal Priority Disciplines Outcome   SLP Goal     SLP Ongoing (interventions implemented as appropriate)   Description:  Speech Language Pathology Goals  Goals expected to be met by 4/6  1. Pt will tolerate dental soft diet with thin liquids without overt s/s aspiration.  2. Pt will complete speech, language, cognitive evaluation.                   Plan:   Patient to be seen Therapy Frequency: 5 x/week   Plan of Care expires: 04/29/17  Plan of Care reviewed with: patient  SLP Follow-up?: Yes         SLP G-Codes  Functional Assessment Tool Used: noms  Score: 5  Functional Limitations: Swallowing  Swallow Current Status (): ADAM  Swallow Goal Status (): JORDAN Stewart,  CCC-SLP  03/30/2017

## 2017-03-30 NOTE — PROGRESS NOTES
Pt returned from CT without complications. Charge RNIza notified regarding return. Bed in lowest position, side rails raised x 3, call light within reach. Reattached to bedside monitor. VSS/ WCTM.

## 2017-03-30 NOTE — PLAN OF CARE
Transition of care note    Transfer to room 707A    DX: Cerebrovascular accident (CVA), unspecified mechanism [I63.9]  Cerebrovascular accident (CVA), unspecified mechanism [I63.9]     PT/OT:patient to return to [facility] in [place]    Insurance: Payor: MEDICARE / Plan: MEDICARE PART A & B / Product Type: Government /      PCP: Lonny Andujar MD     Pharmacy: Facility pharmacy    No future appointments.     Marion Jimenez CM  [facility]  [id number]

## 2017-03-30 NOTE — PLAN OF CARE
Patient uses facility pharmacy.    This CM spoke with patient at bedside and grand-daughter by phone. Was informed by patient and daughter she resides at [facility] in [place], and she will be returning there.       03/30/17 0921   Discharge Assessment   Assessment Type Discharge Planning Assessment   Confirmed/corrected address and phone number on facesheet? Yes   Assessment information obtained from? Patient;Caregiver   Expected Length of Stay (days) 2   Communicated expected length of stay with patient/caregiver yes   Prior to hospitilization cognitive status: Alert/Oriented   Prior to hospitalization functional status: Needs Assistance   Current cognitive status: Alert/Oriented   Current Functional Status: Needs Assistance;Assistive Equipment   Arrived From [facility]  (transfer from [facility])   Lives With facility resident  (she is a resident of [facility] in [place])   Able to Return to Prior Arrangements yes   Is patient able to care for self after discharge? No   How many people do you have in your home that can help with your care after discharge? 2   Who are your caregiver(s) and their phone number(s)? (patient grand-daughter Toshia Snowden [phone] & frank Jeimy Jomar [phone])   Patient's perception of discharge disposition other (comments)  (return to [facility])   Readmission Within The Last 30 Days no previous admission in last 30 days   Patient currently being followed by outpatient case management? No   Patient currently receives home health services? No   Does the patient currently use HME? No   Patient currently receives private duty nursing? No   Patient currently receives any other outside agency services? No   Equipment Currently Used at Home wheelchair   Do you have any problems affording any of your prescribed medications? No   Is the patient taking medications as prescribed? yes   Do you have any financial concerns preventing you from  receiving the healthcare you need? No   Does the patient have transportation to healthcare appointments? Yes   Transportation Available ambulance   On Dialysis? No   Does the patient receive services at the Coumadin Clinic? No   Are there any open cases? No   Discharge Plan A Return to nursing home   Discharge Plan B Return to Nursing Home   Patient/Family In Agreement With Plan yes       Marion Jimenez CM  [id number]

## 2017-03-30 NOTE — NURSING
PA with [facility] Notified of pt having cloudy yellow urine with foul odor. Pt has an outside mayers catheter in placed that was not removed. PA stated is okay to remove mayers. Will remove mayers and monitor patient voiding status post removal, will bladder scan as needed.

## 2017-03-30 NOTE — SUBJECTIVE & OBJECTIVE
Neurologic Chief Complaint: L jaw pain    Subjective:     Interval History: Patient is seen for follow-up neurological assessment and treatment recommendations: VIVIAN. Patient stepped down from [facility]. Awaiting ENT consult. Jaw pain improved today; facial asymmetry improved.     HPI, Past Medical, Family, and Social History remains the same as documented in the initial encounter.     Review of Systems   HENT:        Left jaw pain, patient also reports gum pain with eating   Eyes: Negative for visual disturbance.   Respiratory: Negative for shortness of breath.    Gastrointestinal: Negative for nausea.   Neurological: Negative for headaches.   Psychiatric/Behavioral: Positive for confusion.     Scheduled Meds:   amlodipine  10 mg Oral Daily    aspirin  81 mg Oral Daily    atenolol  50 mg Oral Daily    cefTRIAXone (ROCEPHIN) IVPB  1 g Intravenous Q24H    heparin (porcine)  5,000 Units Subcutaneous Q8H    levothyroxine  75 mcg Oral Daily    lidocaine HCL 10 mg/ml (1%)  2.1 mL Intramuscular Once    pravastatin  20 mg Oral Daily    triamcinolone acetonide 0.1%   Topical (Top) BID     Continuous Infusions:   PRN Meds:dextrose 50%, dextrose 50%, glucagon (human recombinant), glucose, glucose, guaifenesin 100 mg/5 ml, insulin aspart, loperamide, ondansetron    Objective:     Vital Signs (Most Recent):  Temp: 100.3 °F (37.9 °C) (03/30/17 1430)  Pulse: 70 (03/30/17 1430)  Resp: 18 (03/30/17 1430)  BP: 135/64 (03/30/17 1430)  SpO2: 97 % (03/30/17 1430)  BP Location: Right arm    Vital Signs Range (Last 24H):  Temp:  [99.2 °F (37.3 °C)-100.3 °F (37.9 °C)]   Pulse:  [68-81]   Resp:  [10-26]   BP: (121-155)/(58-72)   SpO2:  [92 %-98 %]   BP Location: Right arm    Physical Exam   Constitutional: She appears well-developed and well-nourished.   HENT:   Head: Normocephalic and atraumatic.   Eyes: EOM are normal. Pupils are equal, round, and reactive to light.   Neck: Normal range of motion. Neck supple.   Musculoskeletal:    Some limitation of RUE 2/2 rotator cuff injury   Neurological: She is alert.   Skin: Skin is warm and dry.   Psychiatric: She has a normal mood and affect.       Neurological Exam:   LOC: alert and follows requests  Language: No aphasia  Speech: No dysarthria  Orientation: Person, Place  Visual Fields (CN II): Full  EOM (CN III, IV, VI): Full/intact  Pupils (CN III, IV, VI): PERRL  Facial Sensation (CN V): L facial pain  Facial Movement (CN VII): symmetric facial expression  Tongue (CN XII): to midline  Motor*: Arm Left:  Paretic:  4/5, Leg Left:   Paretic:  4/5, Arm Right:   Paretic:  4/5, Leg Right:   Paretic:  4/5  Cerebellar*: Normal limb  Sensation: intact to light touch, temperature and vibration    NIH Stroke Scale:    Level of Consciousness: 0 - alert  LOC Questions: 0 - answers both correctly  LOC Commands: 0 - performs both correctly  Best Gaze: 0 - normal  Visual: 0 - no visual loss  Facial Palsy: 0 - normal  Motor Left Arm: 0 - no drift  Motor Right Arm: 0 - no drift  Motor Left Le - no drift  Motor Right Le - no drift  Limb Ataxia: 0 - absent  Sensory: 0 - normal  Best Language: 0 - no aphasia  Dysarthria: 0 - normal articulation  Extinction and Inattention: 0 - no neglect  NIH Stroke Scale Total: 0      Laboratory:  CMP: No results for input(s): GLUCOSE, CALCIUM, ALBUMIN, PROT, NA, K, CO2, CL, BUN, CREATININE, ALKPHOS, ALT, AST, BILITOT in the last 24 hours.  BMP:   Recent Labs  Lab 17  0203      K 4.7      CO2 23   BUN 47*   CREATININE 2.10*   CALCIUM 9.2     CBC:   Recent Labs  Lab 17  0203   WBC 12.50   RBC 3.17*   HGB 9.6*   HCT 29.3*      MCV 92   MCH 30.2   MCHC 32.7     Lipid Panel:   Recent Labs  Lab 17  1616   CHOL 131   LDLCALC 79.4   HDL 36*   TRIG 78     Coagulation:   Recent Labs  Lab 17  0203   INR 1.0     Platelet Aggregation Study: No results for input(s): PLTAGG, PLTAGINTERP, PLTAGREGLACO, ADPPLTAGGREG in the last 168 hours.  Hgb  A1C:   Recent Labs  Lab 03/29/17  0549   HGBA1C 6.6*     TSH: No results for input(s): TSH in the last 168 hours.    Diagnostic Results:  I have personally reviewed: CT Head. Date: 3/29/17 and MRI Head. Date: 3/29/17  Findings: No acute intracranial abnormality     Carotid US 1-39% bilaterally

## 2017-03-30 NOTE — NURSING
Pt transported to and from CT scan with RN on room air and cardiac monitoring. PT VS stable. Pt tolerated transfer well and back to room 3076. Will continue to monitor and notify as needed.

## 2017-03-30 NOTE — PROGRESS NOTES
Contacted MD Suzette with [facility] regarding AM labs. Pt has no AM labs ordered. MD states he will place orders shortly. Will await orders for further direction. VSS/ WCTM.

## 2017-03-30 NOTE — NURSING
Pt transported to room 707A via bed on cardiac monitoring and room air with RN. Pt VS stable. Pt sent with all belongings and medications. Handoff report given to ADAN Henry. RN at bedside to receive pt. VS stable.

## 2017-03-30 NOTE — ASSESSMENT & PLAN NOTE
-TTF vascular neurology today  -neurologically stable  -no evidence of acute infarct on CTx2 and MRI  -CT maxillofacial revealed sialolithiasis, recommend ENT consult once transferred  -ASA 81 mg initiated today  -DVT prophylaxis subcu heparin 5000 mg q8h

## 2017-03-30 NOTE — PROGRESS NOTES
[facility]  Neurocritical Care  Progress Note    Admit Date: 3/29/2017  Length of Stay: 1    Subjective:     Chief Complaint: Facial droop    History of Present Illness: Ms. Hahn is a 89 y/o female with pmhx of HTN, HLD, and borderline DM2 who presents to [facility] as a transfer from [facility] s/p TPA. Pt reports onset of left facial weakness, jaw pain, and dysarthria while at her residence at [facility] in [place]. Initial head CT was negative. Upon arrival pt c/o left facial weakness, jaw pain, and dysarthria. Pt states she has not been able to wear her mandibular dentures d/t pain. Pt denies extremity weakness, confusion, sensory or visual deficit. Denies previous CVA, TIA. Pt will be admitted to [facility] for higher level of care.         Hospital Course:  3/29 - pt received TPA at OSH, transferred to [facility], CT head negative, CT maxillofacial positive for sialolithiasis           - MRI negative for acute infarction   3/30 - post TPA CT head negative, will transfer to vascular neurology       Interval History: NAEON    Review of Systems:   Constitutional: Denies fevers or chills.  Pulmonary: Denies shortness of breath or cough.  Cardiology: Denies chest pain or palpitations.  GI: Denies abdominal pain or constipation.  Neurologic: Denies new weakness,  headache, or paresthesias.  EENT: Positive for L jaw pain       Vitals:   Temp: 99.3 °F (37.4 °C) (03/30/17 0701)  Pulse: 75 (03/30/17 1000)  Resp: 20 (03/30/17 1000)  BP: 134/60 (03/30/17 1000)  SpO2: (!) 93 % (03/30/17 1000)    Temp:  [99.3 °F (37.4 °C)-99.5 °F (37.5 °C)] 99.3 °F (37.4 °C)  Pulse:  [68-81] 75  Resp:  [10-26] 20  SpO2:  [92 %-100 %] 93 %  BP: (121-155)/(58-95) 134/60     Resp Rate Total:  [18 br/min] 18 br/min    03/29 0701 - 03/30 0700  In: 1059.2 [I.V.:1009.2]  Out: 1345 [Urine:1345]     Examination:   Constitutional: Well-nourished and -developed. No apparent distress.   Eyes:Conjunctiva clear, anicteric. Lids no  lesions.  Head/Ears/Nose/Mouth/Throat/Neck: Moist mucous membranes. External ears, nose atraumatic.   Cardiovascular: Regular rhythm. No murmurs. No leg edema.  Respiratory: Comfortable respirations. Clear to auscultation.  Gastrointestinal: No hernia. Soft, nondistended, nontender. + bowel sounds.    Neurologic:  -GCS E4V5M6  -Alert. Oriented to person, place, and time. Speech fluent. Follows commands.  -Cranial nerves intact   -Motor grossly intact   -Sensation grossly intact      Medications:   Continuous Scheduled    amlodipine 10 mg Daily   aspirin 81 mg Daily   atenolol 50 mg Daily   cefTRIAXone (ROCEPHIN) IVPB 1 g Q24H   cholecalciferol (vitamin D3) 50,000 Units Daily   heparin (porcine) 5,000 Units Q8H   levothyroxine 75 mcg Daily   lidocaine HCL 10 mg/ml (1%) 2.1 mL Once   pravastatin 20 mg Daily   triamcinolone acetonide 0.1%  BID   PRN    dextrose 50% 12.5 g PRN   dextrose 50% 25 g PRN   glucagon (human recombinant) 1 mg PRN   glucose 16 g PRN   glucose 24 g PRN   guaifenesin 100 mg/5 ml 200 mg TID PRN   insulin aspart 1-10 Units QID (AC + HS) PRN   loperamide 2 mg QID PRN   ondansetron 4 mg Q8H PRN      Today I independently reviewed pertinent medications, imaging, notably: added ASA 81 and sq Hep, reviewed CT head and CT maxillofacial.     Assessment/Plan:     Neuro  Cerebrovascular accident (CVA)  -TTF vascular neurology today  -neurologically stable  -no evidence of acute infarct on CTx2 and MRI  -CT maxillofacial revealed sialolithiasis, recommend ENT consult once transferred  -ASA 81 mg initiated today  -DVT prophylaxis subcu heparin 5000 mg q8h    Cardiac  Hypertension  -cont to monitor HR and BP  -amlodipine 10 mg qd   -atenolol 50 mg qd   --200    Renal  Controlled type 2 diabetes mellitus with stage 3 chronic kidney disease  -cont SSI      Acute cystitis without hematuria  -continue rocephin  -cx pending  -no fever, no leukocytosis    Endocrine  Hypothyroidism  -levothyroxine 75 mcg qd      Fluids/Electrolytes/Nutrition/GI  Hypercholesterolemia  -pravastatin 20 mg qd     Other  * Facial droop  See CVA       Prophylaxis:  Venous Thromboembolism: chemical  Stress Ulcer: None  Ventilator Pneumonia: not applicable     Activity Orders          Up with assistance starting at 03/30 1139        No Order    Carmina Ozuna PA-C  Neurocritical Care  [facility]

## 2017-03-30 NOTE — NURSING
CT head w/o contrast ordered. CT tech called about scheduling for the image. CT tech stated their is currently a patient on the table and a possible stroke code so it wont be at this time. RN left name and spectralink number with tech. Will follow up as needed.

## 2017-03-30 NOTE — PROGRESS NOTES
ICU Attending Note  Neurocritical Care    E4V4M6    MR brain, CT head no stroke    -aspirin 81 mg  -pravastatin  --220  -amlodipine, atenolol  -ceftriaxone for UTI  -ISS  -heparin prophylaxis  -ENT v OMFS to evaluate jaw    Goal: Transfer to floor on Vascular.

## 2017-03-30 NOTE — PLAN OF CARE
Problem: Patient Care Overview  Goal: Plan of Care Review  Outcome: Ongoing (interventions implemented as appropriate)  No acute events throughout night. O2 remains stable on RA with clear lung fields throughout. NSR overnight. Mitchell remains in place draining cloudy, yellow U/O. Pt verbalized complaints of abdominal cramps; no BM throughout shift. CT of jaw performed. Facial drooping and slight slurred speech present since start of shift. No new symptoms present. Pt verbalizes complaints of left jaw pain radiating to ear. Plan for MRI today. Mechanical soft diet continued. VS and assessment per flow sheet, patient progressing towards goals as tolerated, plan of care reviewed with Myah Apodaca and granddaughter, all concerns addressed, will continue to monitor.

## 2017-03-30 NOTE — PLAN OF CARE
Problem: SLP Goal  Goal: SLP Goal  Outcome: Ongoing (interventions implemented as appropriate)  Bedside swallow study completed.  Rec continue dental soft diet with thin liquids with strict aspiration precautions.  Meds whole 1 at a time or crushed in puree if pt unable to tolerate.  JORDAN Johnson, CCC/SLP  3/30/2017

## 2017-03-31 VITALS
RESPIRATION RATE: 20 BRPM | BODY MASS INDEX: 22.08 KG/M2 | DIASTOLIC BLOOD PRESSURE: 61 MMHG | TEMPERATURE: 97 F | WEIGHT: 120 LBS | OXYGEN SATURATION: 98 % | HEIGHT: 62 IN | HEART RATE: 74 BPM | SYSTOLIC BLOOD PRESSURE: 129 MMHG

## 2017-03-31 PROBLEM — I50.30 (HFPEF) HEART FAILURE WITH PRESERVED EJECTION FRACTION: Status: ACTIVE | Noted: 2017-03-31

## 2017-03-31 PROBLEM — K11.5 SIALOLITHIASIS OF SUBMANDIBULAR GLAND: Status: ACTIVE | Noted: 2017-03-31

## 2017-03-31 LAB
ANION GAP SERPL CALC-SCNC: 12 MMOL/L
BACTERIA UR CULT: NORMAL
BASOPHILS # BLD AUTO: 0.02 K/UL
BASOPHILS NFR BLD: 0.2 %
BUN SERPL-MCNC: 38 MG/DL
CALCIUM SERPL-MCNC: 8.8 MG/DL
CHLORIDE SERPL-SCNC: 106 MMOL/L
CO2 SERPL-SCNC: 21 MMOL/L
CREAT SERPL-MCNC: 2.2 MG/DL
DIFFERENTIAL METHOD: ABNORMAL
EOSINOPHIL # BLD AUTO: 0.2 K/UL
EOSINOPHIL NFR BLD: 1.4 %
ERYTHROCYTE [DISTWIDTH] IN BLOOD BY AUTOMATED COUNT: 13.1 %
EST. GFR  (AFRICAN AMERICAN): 21.9 ML/MIN/1.73 M^2
EST. GFR  (NON AFRICAN AMERICAN): 19 ML/MIN/1.73 M^2
GLUCOSE SERPL-MCNC: 166 MG/DL
HCT VFR BLD AUTO: 29.2 %
HGB BLD-MCNC: 9.3 G/DL
LDLC SERPL-MCNC: 86 MG/DL
LYMPHOCYTES # BLD AUTO: 2.2 K/UL
LYMPHOCYTES NFR BLD: 19 %
MCH RBC QN AUTO: 29.5 PG
MCHC RBC AUTO-ENTMCNC: 31.8 %
MCV RBC AUTO: 93 FL
MONOCYTES # BLD AUTO: 0.7 K/UL
MONOCYTES NFR BLD: 6.1 %
NEUTROPHILS # BLD AUTO: 8.4 K/UL
NEUTROPHILS NFR BLD: 73 %
PLATELET # BLD AUTO: 287 K/UL
PMV BLD AUTO: 9.7 FL
POCT GLUCOSE: 148 MG/DL (ref 70–110)
POCT GLUCOSE: 191 MG/DL (ref 70–110)
POTASSIUM SERPL-SCNC: 4.6 MMOL/L
RBC # BLD AUTO: 3.15 M/UL
SODIUM SERPL-SCNC: 139 MMOL/L
WBC # BLD AUTO: 11.45 K/UL

## 2017-03-31 PROCEDURE — 85025 COMPLETE CBC W/AUTO DIFF WBC: CPT

## 2017-03-31 PROCEDURE — 25000003 PHARM REV CODE 250: Performed by: PSYCHIATRY & NEUROLOGY

## 2017-03-31 PROCEDURE — 92526 ORAL FUNCTION THERAPY: CPT

## 2017-03-31 PROCEDURE — G8988 SELF CARE GOAL STATUS: HCPCS | Mod: CJ

## 2017-03-31 PROCEDURE — 97165 OT EVAL LOW COMPLEX 30 MIN: CPT

## 2017-03-31 PROCEDURE — 99233 SBSQ HOSP IP/OBS HIGH 50: CPT | Mod: GC,,, | Performed by: PSYCHIATRY & NEUROLOGY

## 2017-03-31 PROCEDURE — G8979 MOBILITY GOAL STATUS: HCPCS | Mod: CL

## 2017-03-31 PROCEDURE — 92523 SPEECH SOUND LANG COMPREHEN: CPT

## 2017-03-31 PROCEDURE — 63600175 PHARM REV CODE 636 W HCPCS: Performed by: PSYCHIATRY & NEUROLOGY

## 2017-03-31 PROCEDURE — G8980 MOBILITY D/C STATUS: HCPCS | Mod: CL

## 2017-03-31 PROCEDURE — 36415 COLL VENOUS BLD VENIPUNCTURE: CPT

## 2017-03-31 PROCEDURE — 80048 BASIC METABOLIC PNL TOTAL CA: CPT

## 2017-03-31 PROCEDURE — 97161 PT EVAL LOW COMPLEX 20 MIN: CPT

## 2017-03-31 PROCEDURE — G8987 SELF CARE CURRENT STATUS: HCPCS | Mod: CK

## 2017-03-31 PROCEDURE — G8978 MOBILITY CURRENT STATUS: HCPCS | Mod: CL

## 2017-03-31 PROCEDURE — 25000003 PHARM REV CODE 250: Performed by: PHYSICIAN ASSISTANT

## 2017-03-31 RX ORDER — NAPROXEN SODIUM 220 MG/1
81 TABLET, FILM COATED ORAL DAILY
Refills: 0 | COMMUNITY
Start: 2017-03-31 | End: 2018-01-01

## 2017-03-31 RX ORDER — TRAMADOL HYDROCHLORIDE 50 MG/1
50 TABLET ORAL 3 TIMES DAILY PRN
Qty: 30 TABLET | Refills: 0 | Status: SHIPPED | OUTPATIENT
Start: 2017-03-31

## 2017-03-31 RX ORDER — TRAMADOL HYDROCHLORIDE 50 MG/1
50 TABLET ORAL 3 TIMES DAILY PRN
Qty: 30 TABLET | Refills: 0 | Status: SHIPPED | OUTPATIENT
Start: 2017-03-31 | End: 2017-03-31

## 2017-03-31 RX ORDER — AMOXICILLIN AND CLAVULANATE POTASSIUM 500; 125 MG/1; MG/1
1 TABLET, FILM COATED ORAL 2 TIMES DAILY
Qty: 10 TABLET | Refills: 0 | Status: SHIPPED | OUTPATIENT
Start: 2017-03-31 | End: 2017-04-05

## 2017-03-31 RX ADMIN — ASPIRIN 81 MG CHEWABLE TABLET 81 MG: 81 TABLET CHEWABLE at 07:03

## 2017-03-31 RX ADMIN — AMLODIPINE BESYLATE 10 MG: 10 TABLET ORAL at 07:03

## 2017-03-31 RX ADMIN — INSULIN ASPART 2 UNITS: 100 INJECTION, SOLUTION INTRAVENOUS; SUBCUTANEOUS at 08:03

## 2017-03-31 RX ADMIN — PRAVASTATIN SODIUM 20 MG: 20 TABLET ORAL at 07:03

## 2017-03-31 RX ADMIN — LEVOTHYROXINE SODIUM 75 MCG: 75 TABLET ORAL at 07:03

## 2017-03-31 RX ADMIN — CEFTRIAXONE 1 G: 1 INJECTION, SOLUTION INTRAVENOUS at 05:03

## 2017-03-31 RX ADMIN — TRIAMCINOLONE ACETONIDE 1 OZ: 1 CREAM TOPICAL at 07:03

## 2017-03-31 RX ADMIN — HEPARIN SODIUM 5000 UNITS: 5000 INJECTION, SOLUTION INTRAVENOUS; SUBCUTANEOUS at 05:03

## 2017-03-31 RX ADMIN — ATENOLOL 50 MG: 50 TABLET ORAL at 07:03

## 2017-03-31 NOTE — CONSULTS
Otolaryngology - Head and Neck Surgery  Consultation Report    Consultation From: Neurology    Chief Complaint: Left facial swelling    History of Present Illness: 89 y.o. year old female who presented as a transfer with left facial droop, dysarthria, and jaw pain.  She was initially thought to have a stroke and was given IV tPA prior to transfer, but subsequently was found to have a submandibular duct calculus and sialoadenitis.  She is planned for discharge to her nursing home today..  Patient reports left-sided jaw pain that was increased with eating anything hard or chewy.  She has not had previous salivary gland stones, recent oral surgery, or any head/neck surgery. She reports great improvement in her facial movement, pain, and swelling over the last day.      Review of Systems - Negative except as per HPI    Past Medical History: Patient has a past medical history of Aphasia; Arthritis; Hypertension; Renal disorder; and Thyroid disease.    Past Surgical History: Patient has no past surgical history on file.    Social History: Patient reports that she has never smoked. She does not have any smokeless tobacco history on file. She reports that she does not drink alcohol.    Family History: family history is not on file.    Medications:    amlodipine  10 mg Oral Daily    aspirin  81 mg Oral Daily    atenolol  50 mg Oral Daily    cefTRIAXone (ROCEPHIN) IVPB  1 g Intravenous Q24H    heparin (porcine)  5,000 Units Subcutaneous Q8H    levothyroxine  75 mcg Oral Daily    lidocaine HCL 10 mg/ml (1%)  2.1 mL Intramuscular Once    pravastatin  20 mg Oral Daily    triamcinolone acetonide 0.1%   Topical (Top) BID       Allergies: Patient has No Known Allergies.    Physical Exam:  Temp:  [97.3 °F (36.3 °C)-100.3 °F (37.9 °C)] 97.3 °F (36.3 °C)  Pulse:  [70-83] 74  Resp:  [17-20] 20  SpO2:  [96 %-98 %] 98 %  BP: (129-152)/(61-78) 129/61        Constitutional: Well appearing / communicating.  NAD.  Eyes: EOM I  Bilaterally  Head/Face: Normocephalic.  No edema, symmetric. Negative paranasal sinus pressure/tenderness.  Salivary glands WNL.  House Brackmann I Bilaterally.  Previous lower left facial weakness is not present on my exam - normal movement and symmetric bilaterally.  Right Ear:   Auricle WNL.  Left Ear: Auricle WNL.  Nose: No gross nasal septal deviation. Inferior Turbinates 2+ bilaterally. No septal perforation. No masses/lesions. External nasal skin without masses/lesions.  Oral Cavity: Gingiva/lips WNL.  FOM Soft, no masses palpated. Oral Tongue mobile. Hard Palate WNL.  Clear saliva expressed from bilateral Canadian's and Stensen's ducts on bimanual palpation, no pus.  Mild tenderness to palpation of submandibular and parotid glands on the left.  Oropharynx: BOT WNL. No masses/lesions noted. Tonsillar fossa/pharyngeal wall without lesions. Posterior oropharynx WNL.  Soft palate without masses. Midline uvula.   Neck/Lymphatic: No LAD I-VI bilaterally.  No thyromegaly.  No masses noted on exam.  Neuro/Psychiatric: AOx3.  Normal mood and affect.   Cardiovascular: Normal carotid pulses bilaterally, no increasing jugular venous distention noted at cervical region bilaterally.    Respiratory: Normal respiratory effort, no stridor, no retractions noted.    CBC    Recent Labs  Lab 03/31/17  0803   WBC 11.45   HGB 9.3*   HCT 29.2*   MCV 93        BMP    Recent Labs  Lab 03/31/17  0803   *      K 4.6      CO2 21*   BUN 38*   CREATININE 2.2*   CALCIUM 8.8     CT max/face reviewed - 4mm  calculus in the proximal left submandibular duct, and submandibular soft tissue stranding without abscess.     Assessment: 89 year old female with left submandibular sialoadenitis and sialolithiasis.    Plan:   - Patient improving on current treatment regimen, facial movement normal on my exam  - Continue current antibiotics (Augmentin) for 1 week total  - Sialogogues (tart liquids, lemon candy), warm compresses,  and gland massage  - Patient may follow-up with Dr. Pena in ENT clinic in 1-2 weeks  - Thank you for the consult, OK for discharge from ENT perspective

## 2017-03-31 NOTE — PT/OT/SLP EVAL
Occupational Therapy  Evaluation    Myah Apodaca   MRN: [id number]   Admitting Diagnosis: Facial droop    OT Date of Treatment: 17   OT Start Time: 1121  OT Stop Time: 1142  OT Total Time (min): 21 min    Billable Minutes:  Evaluation 21    Diagnosis: Facial droop   Left facial droop, dysarthria, jaw pain, given tPA    Past Medical History:   Diagnosis Date    Aphasia     Arthritis     Hypertension     Renal disorder     Thyroid disease       History reviewed. No pertinent surgical history.    Referring physician: MD Guillermo  Date referred to OT: 3/31/17    General Precautions: Standard, fall (cardiac)    Do you have any cultural, spiritual, Yazidism conflicts, given your current situation?: Church     Patient History:  Living Environment  Living Environment Comment: Pt resides at [facility] in [place].  Pt has all DME needed available.    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  Pt reported that she is able to don shirts, socks, & shoes and take showers from shower chair without (A) (pt requires (A) to get onto shower chair).  Pt  does not walk & uses w/c for all mobility.  Pt is able to self-propel her w/c.     Dominant hand: right    Subjective:  Communicated with RN prior to session.  Pt reported that she has lived at [facility] for 2 years.  Chief Complaint: initially jaw pain  Patient/Family stated goals: none stated    Pain Ratin/10  Pain Rating Post-Intervention: 0/10    Objective:  Patient found with: telemetry, peripheral IV    Cognitive Exam:  Oriented to: Person, Place, Time and Situation  Follows Commands/attention: Follows one-step commands  Communication:  clear/fluent  Memory:  Impaired STM  Safety awareness/insight to disability: intact  Coping skills/emotional control: Appropriate to situation    Visual/perceptual:  Intact    Physical Exam:  Postural examination/scapula alignment: Rounded shoulder, Head forward and Posterior pelvic tilt  Skin integrity: Thin and Dry  Edema: None noted BUE    Sensation:   Intact  light/touch BUE    Upper Extremity Range of Motion:  Right Upper Extremity: WFL  Left Upper Extremity: WFL except ~ 2* shoulder flexion mostly through shoulder elevation (actively), PROM for shoulder flexion ~ 90* (chronic condition)    Upper Extremity Strength:  Right Upper Extremity: WFL  Left Upper Extremity: WFL except 2-/5 shoulder flexion   Strength: WFL    Fine motor coordination:   Intact  Left hand thumb/finger opposition skills and Right hand thumb/finger opposition skills    Gross motor coordination: WFL    Functional Mobility:  Bed Mobility:  Supine to Sit: Contact Guard Assistance  Sit to Supine: Contact Guard Assistance    Transfers:  Sit <> Stand Assistance: Minimum Assistance (from EOB including taking step to the left along side of bed)  Sit <> Stand Assistive Device: No Assistive Device    Activities of Daily Living:     UE Dressing Level of Assistance: Contact guard (seated on EOB)  LE Dressing Level of Assistance: Contact guard (donning socks seated EOB)  Grooming Position: Seated, EOB  Grooming Level of Assistance: Stand by assistance     Therapeutic Activities and Exercises:  Pt required SBA-CGA with postural control while seated EOB.    AM-PAC 6 CLICK ADL  How much help from another person does this patient currently need?  1 = Unable, Total/Dependent Assistance  2 = A lot, Maximum/Moderate Assistance  3 = A little, Minimum/Contact Guard/Supervision  4 = None, Modified [place]/Independent    Putting on and taking off regular lower body clothing? : 3  Bathing (including washing, rinsing, drying)?: 3  Toileting, which includes  "using toilet, bedpan, or urinal? : 3  Putting on and taking off regular upper body clothing?: 3  Taking care of personal grooming such as brushing teeth?: 3  Eating meals?: 3  Total Score: 18    AM-PAC Raw Score CMS "G-Code Modifier Level of Impairment Assistance   6 % Total / Unable   7 - 9 CM 80 - 100% Maximal Assist   10 - 14 CL 60 - 80% Moderate Assist   15 - 19 CK 40 - 60% Moderate Assist   20 - 22 CJ 20 - 40% Minimal Assist   23 CI 1-20% SBA / CGA   24 CH 0% Independent/ Mod I       Patient left supine with all lines intact, call button in reach, bed alarm on, RN notified and white board updated.    Assessment:  Myah Apodaca is a 89 y.o. female with a medical diagnosis of Facial droop and presents with deficits in ADL's &  Postural control affecting performance in ADL's.  Pt would benefit from OT to address independence with ADL's & to assist with maintaining strength while in hospital.    Rehab identified problem list/impairments: Rehab identified problem list/impairments: weakness, impaired endurance, impaired self care skills, impaired functional mobilty, impaired balance, decreased upper extremity function, decreased lower extremity function    Rehab potential is good.    Activity tolerance: Good    Discharge recommendations: Discharge Facility/Level Of Care Needs: nursing facility, basic (return to NH)     GOALS:   Occupational Therapy Goals        Problem: Occupational Therapy Goal    Goal Priority Disciplines Outcome Interventions   Occupational Therapy Goal     OT, PT/OT Ongoing (interventions implemented as appropriate)    Description:  Goals to be met by: 4/7/17     Patient will increase functional independence with ADLs by performing:    UE Dressing with Supervision.  LE Dressing with Minimal Assistance donning socks & pants.  Grooming while seated with Set-up Assistance.  Toileting from bedside commode with Minimal Assistance for hygiene and clothing management.   Rolling to Bilateral " with Modified [place].   Supine to sit with Modified [place].  Stand pivot transfers with Stand-by Assistance.                PLAN:  Patient to be seen 3 x/week to address the above listed problems via self-care/home management, neuromuscular re-education, cognitive retraining, sensory integration, therapeutic activities, therapeutic exercises  Plan of Care expires: 04/30/17  Plan of Care reviewed with: patient    OT G-codes  Functional Assessment Tool Used: AMPAC  Score: 18  Functional Limitation: Self care  Self Care Current Status (): CK  Self Care Goal Status (): CALVIN Baez  03/31/2017

## 2017-03-31 NOTE — PROGRESS NOTES
[facility]  Vascular Neurology  Comprehensive Stroke Center  Progress Note      Neurologic Chief Complaint: L jaw pain    Subjective:     Interval History: Patient is seen for follow-up neurological assessment and treatment recommendations: VIVIAN. Patient stepped down from [facility]. Awaiting ENT consult. Jaw pain improved today; facial asymmetry improved.     HPI, Past Medical, Family, and Social History remains the same as documented in the initial encounter.     Review of Systems   HENT:        Left jaw pain, patient also reports gum pain with eating   Eyes: Negative for visual disturbance.   Respiratory: Negative for shortness of breath.    Gastrointestinal: Negative for nausea.   Neurological: Negative for headaches.   Psychiatric/Behavioral: Positive for confusion.     Scheduled Meds:   amlodipine  10 mg Oral Daily    aspirin  81 mg Oral Daily    atenolol  50 mg Oral Daily    cefTRIAXone (ROCEPHIN) IVPB  1 g Intravenous Q24H    heparin (porcine)  5,000 Units Subcutaneous Q8H    levothyroxine  75 mcg Oral Daily    lidocaine HCL 10 mg/ml (1%)  2.1 mL Intramuscular Once    pravastatin  20 mg Oral Daily    triamcinolone acetonide 0.1%   Topical (Top) BID     Continuous Infusions:   PRN Meds:dextrose 50%, dextrose 50%, glucagon (human recombinant), glucose, glucose, guaifenesin 100 mg/5 ml, insulin aspart, loperamide, ondansetron    Objective:     Vital Signs (Most Recent):  Temp: 100.3 °F (37.9 °C) (03/30/17 1430)  Pulse: 70 (03/30/17 1430)  Resp: 18 (03/30/17 1430)  BP: 135/64 (03/30/17 1430)  SpO2: 97 % (03/30/17 1430)  BP Location: Right arm    Vital Signs Range (Last 24H):  Temp:  [99.2 °F (37.3 °C)-100.3 °F (37.9 °C)]   Pulse:  [68-81]   Resp:  [10-26]   BP: (121-155)/(58-72)   SpO2:  [92 %-98 %]   BP Location: Right arm    Physical Exam   Constitutional: She appears well-developed and well-nourished.   HENT:   Head: Normocephalic and atraumatic.   Eyes: EOM are normal. Pupils are  equal, round, and reactive to light.   Neck: Normal range of motion. Neck supple.   Musculoskeletal:   Some limitation of RUE 2/2 rotator cuff injury   Neurological: She is alert.   Skin: Skin is warm and dry.   Psychiatric: She has a normal mood and affect.       Neurological Exam:   LOC: alert and follows requests  Language: No aphasia  Speech: No dysarthria  Orientation: Person, Place  Visual Fields (CN II): Full  EOM (CN III, IV, VI): Full/intact  Pupils (CN III, IV, VI): PERRL  Facial Sensation (CN V): L facial pain  Facial Movement (CN VII): symmetric facial expression  Tongue (CN XII): to midline  Motor*: Arm Left:  Paretic:  4/5, Leg Left:   Paretic:  4/5, Arm Right:   Paretic:  4/5, Leg Right:   Paretic:  4/5  Cerebellar*: Normal limb  Sensation: intact to light touch, temperature and vibration    NIH Stroke Scale:    Level of Consciousness: 0 - alert  LOC Questions: 0 - answers both correctly  LOC Commands: 0 - performs both correctly  Best Gaze: 0 - normal  Visual: 0 - no visual loss  Facial Palsy: 0 - normal  Motor Left Arm: 0 - no drift  Motor Right Arm: 0 - no drift  Motor Left Le - no drift  Motor Right Le - no drift  Limb Ataxia: 0 - absent  Sensory: 0 - normal  Best Language: 0 - no aphasia  Dysarthria: 0 - normal articulation  Extinction and Inattention: 0 - no neglect  NIH Stroke Scale Total: 0      Laboratory:  CMP: No results for input(s): GLUCOSE, CALCIUM, ALBUMIN, PROT, NA, K, CO2, CL, BUN, CREATININE, ALKPHOS, ALT, AST, BILITOT in the last 24 hours.  BMP:   Recent Labs  Lab 17  0203      K 4.7      CO2 23   BUN 47*   CREATININE 2.10*   CALCIUM 9.2     CBC:   Recent Labs  Lab 17  0203   WBC 12.50   RBC 3.17*   HGB 9.6*   HCT 29.3*      MCV 92   MCH 30.2   MCHC 32.7     Lipid Panel:   Recent Labs  Lab 17  1616   CHOL 131   LDLCALC 79.4   HDL 36*   TRIG 78     Coagulation:   Recent Labs  Lab 17  0203   INR 1.0     Platelet Aggregation Study: No  results for input(s): PLTAGG, PLTAGINTERP, PLTAGREGLACO, ADPPLTAGGREG in the last 168 hours.  Hgb A1C:   Recent Labs  Lab 03/29/17  0549   HGBA1C 6.6*     TSH: No results for input(s): TSH in the last 168 hours.    Diagnostic Results:  I have personally reviewed: CT Head. Date: 3/29/17 and MRI Head. Date: 3/29/17  Findings: No acute intracranial abnormality     Carotid US 1-39% bilaterally      Assessment/Plan:     89 y/o female with left facial droop, dysarthira and jaw pain. Received IVtPA at [facility]. Has UTI and elevated kidney function so this could be contributing to symptoms.   MRI NEGATIVE for stroke. CT maxillofacial with 4mm calcification of L submandibular duct suggesting sialolithiasis. Symptoms NOT consistent with stroke. It is likely ENT issue. Patient has been stepped down however to stroke service solely because she received tPA. Awaiting formal ENT consult and if stable from their perspective will likely discharge patient back to facility vs transfer to medicine.       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

## 2017-03-31 NOTE — PLAN OF CARE
Problem: Physical Therapy Goal  Goal: Physical Therapy Goal  Goals to be met by: 17     Patient will increase functional independence with mobility by performin. Supine to sit with Modified [place]  2. Sit to supine with Modified [place]  3. Sit to stand transfer with Modified [place]  4. Bed to chair transfer with Moderate Assistance using appropriate transfer and/or AD.   5. Stand for 1 minutes with Maximum Assistance using appropriate AD for weight-bearing & balance.   6. Lower extremity exercise program x10 reps with assistance as needed           PT evaluation completed. POC and goals established.     Manjula Gurrola, PT, DPT  3/31/2017

## 2017-03-31 NOTE — PLAN OF CARE
Problem: SLP Goal  Goal: SLP Goal  Speech Language Pathology Goals  Goals expected to be met by 4/6  1. Pt will tolerate dental soft diet with thin liquids without overt s/s aspiration.  2. Pt will complete speech, language, cognitive evaluation.  -goals met   Outcome: Outcome(s) achieved Date Met:  03/31/17  Pt tolerating po without overt s/s aspiration though preferring pureed solids 2/2 oral pain with mastication.  Pt at baseline for speehc, language, cognition.  Safe to advance back to soft/thin diet as comfort improves.  No further SLP intervention warranted. JORDAN Johnson, CCC/SLP  3/31/2017

## 2017-03-31 NOTE — ASSESSMENT & PLAN NOTE
- ENT consult placed  - change diet to pureed per patient request  - ENT consult placed  - will attempt to find sour candy for patient to suck on

## 2017-03-31 NOTE — ASSESSMENT & PLAN NOTE
- ENT consulted  - change diet to pureed per patient request  - patient to suck on sour candy Or mariluz

## 2017-03-31 NOTE — SUBJECTIVE & OBJECTIVE
NIH Stroke Scale:    Level of Consciousness: 0 - alert  LOC Questions: 0 - answers both correctly  LOC Commands: 0 - performs both correctly  Best Gaze: 0 - normal  Visual: 0 - no visual loss  Facial Palsy: 1 - minor  Motor Left Arm: 0 - no drift  Motor Right Arm: 0 - no drift  Motor Left Le - no drift  Motor Right Le - no drift  Limb Ataxia: 0 - absent  Sensory: 0 - normal  Best Language: 0 - no aphasia  Dysarthria: 0 - normal articulation  Extinction and Inattention: 0 - no neglect  NIH Stroke Scale Total: 1

## 2017-03-31 NOTE — SUBJECTIVE & OBJECTIVE
Neurologic Chief Complaint: L jaw pain    Subjective:     Interval History: Patient is seen for follow-up neurological assessment and treatment recommendations: VIVIAN. Patient stepped down from [facility]. Awaiting ENT consult. Jaw pain improved today; facial asymmetry improved.     HPI, Past Medical, Family, and Social History remains the same as documented in the initial encounter.     Review of Systems   Constitutional: Negative for chills and fever.   HENT:        Left jaw pain, patient also reports gum pain with eating   Eyes: Negative for visual disturbance.   Respiratory: Negative for cough and shortness of breath.    Cardiovascular: Negative for chest pain.   Gastrointestinal: Negative for abdominal pain and nausea.   Musculoskeletal: Positive for gait problem.   Neurological: Positive for weakness. Negative for headaches.   Psychiatric/Behavioral: Positive for confusion.     Scheduled Meds:   amlodipine  10 mg Oral Daily    aspirin  81 mg Oral Daily    atenolol  50 mg Oral Daily    cefTRIAXone (ROCEPHIN) IVPB  1 g Intravenous Q24H    heparin (porcine)  5,000 Units Subcutaneous Q8H    levothyroxine  75 mcg Oral Daily    lidocaine HCL 10 mg/ml (1%)  2.1 mL Intramuscular Once    pravastatin  20 mg Oral Daily    triamcinolone acetonide 0.1%   Topical (Top) BID     Continuous Infusions:   PRN Meds:dextrose 50%, dextrose 50%, glucagon (human recombinant), glucose, glucose, guaifenesin 100 mg/5 ml, insulin aspart, loperamide, ondansetron    Objective:     Vital Signs (Most Recent):  Temp: 99.4 °F (37.4 °C) (03/31/17 0700)  Pulse: 83 (03/31/17 0700)  Resp: 18 (03/31/17 0700)  BP: 138/63 (03/31/17 0700)  SpO2: 97 % (03/31/17 0700)  BP Location: Right arm    Vital Signs Range (Last 24H):  Temp:  [97.8 °F (36.6 °C)-100.3 °F (37.9 °C)]   Pulse:  [70-83]   Resp:  [17-19]   BP: (121-152)/(58-78)   SpO2:  [93 %-97 %]   BP Location: Right arm    Physical Exam   Constitutional: She is oriented to person, place, and time.  She appears well-developed and well-nourished.   HENT:   Head: Normocephalic and atraumatic.   Eyes: EOM are normal. Pupils are equal, round, and reactive to light.   Neck: Normal range of motion. Neck supple.   Cardiovascular: Normal rate and regular rhythm.    Pulmonary/Chest: Effort normal and breath sounds normal. No respiratory distress. She has no wheezes.   Musculoskeletal:   Some limitation of RUE 2/2 rotator cuff injury   Neurological: She is alert and oriented to person, place, and time.   Skin: Skin is warm and dry.   Psychiatric: She has a normal mood and affect.   Vitals reviewed.      Neurological Exam:   LOC: alert and follows requests  Language: No aphasia  Speech: No dysarthria  Orientation: Person, Place, Time  Visual Fields (CN II): Full  EOM (CN III, IV, VI): Full/intact  Pupils (CN III, IV, VI): PERRL  Facial Sensation (CN V): pain left lower face. Otherwise intact  Facial Movement (CN VII): lower weakness left lower  Tongue (CN XII): to midline  Motor*: Arm Left:  Paretic:  4/5, Leg Left:   Paretic:  4/5, Arm Right:   Paretic:  4/5, Leg Right:   Paretic:  4/5  Sensation: intact to light touch    NIH Stroke Scale:    Level of Consciousness: 0 - alert  LOC Questions: 0 - answers both correctly  LOC Commands: 0 - performs both correctly  Best Gaze: 0 - normal  Visual: 0 - no visual loss  Facial Palsy: 1 - minor  Motor Left Arm: 0 - no drift  Motor Right Arm: 0 - no drift  Motor Left Le - no drift  Motor Right Le - no drift  Limb Ataxia: 0 - absent  Sensory: 0 - normal  Best Language: 0 - no aphasia  Dysarthria: 0 - normal articulation  Extinction and Inattention: 0 - no neglect  NIH Stroke Scale Total: 1      Laboratory:  CMP:     Recent Labs  Lab 17  0803   CALCIUM 8.8      K 4.6   CO2 21*      BUN 38*   CREATININE 2.2*     BMP:     Recent Labs  Lab 17  0803      K 4.6      CO2 21*   BUN 38*   CREATININE 2.2*   CALCIUM 8.8     CBC:     Recent Labs  Lab  03/31/17  0803   WBC 11.45   RBC 3.15*   HGB 9.3*   HCT 29.2*      MCV 93   MCH 29.5   MCHC 31.8*     Lipid Panel:     Recent Labs  Lab 03/29/17  1616   CHOL 131   LDLCALC 79.4   HDL 36*   TRIG 78     Coagulation:     Recent Labs  Lab 03/29/17  0203   INR 1.0     Platelet Aggregation Study: No results for input(s): PLTAGG, PLTAGINTERP, PLTAGREGLACO, ADPPLTAGGREG in the last 168 hours.  Hgb A1C:     Recent Labs  Lab 03/29/17  0549   HGBA1C 6.6*     TSH: No results for input(s): TSH in the last 168 hours.    Diagnostic Results:  I have personally reviewed: CT Head. Date: 3/29/17 and MRI Head. Date: 3/29/17  Findings: No acute intracranial abnormality     Carotid US 1-39% bilaterally    MRI 3/29: no acute infarction. 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

## 2017-03-31 NOTE — PLAN OF CARE
Pt has been approved to return to [facility] today.  Nurse to call report to [phone] to [facility] on [address].  Secure Patient Delivery arranged for 1pm pickup.      Johana Thompson RN  Case Management  [phone]

## 2017-03-31 NOTE — PLAN OF CARE
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       Patient is homebound due to:  Facial droop    Allergies:Review of patient's allergies indicates:  No Known Allergies    Vitals:       Routine, once monthly         Diet: dental soft or pureed (approved for dental soft but pt requesting pureed)   Supplement:  1 can every three times a day with meals and for snacks at patients request                         Type:     Ensure         Please have patient suck on lemon or hard sugar free candy daily until her sialolithiasis (stone in salivary gland duct causing pain) is resolved    Acitivities:   As tolerated      LABS:  Per facility protocol    Nursing Precautions:    - Aspiration precautions:             - Total assistance with meals            -  Upright 90 degrees befor during and after meals             -  Suction at bedside          - Fall precautions per nursing home protocol     - Decubitus precautions:        -  for positioning   - Pressure reducing foam mattress   - Turn patient every two hours. Use wedge pillows to anchor  patient    CONSULTS:     Physical Therapy to evaluate and treat     Occupational Therapy to evaluate and treat     Speech Therapy  to evaluate and treat     Nutrition to evaluate and recommend diet         MISCELLANEOUS CARE:      Routine Skin for Bedridden Patients:  Apply moisture barrier cream to all    skin folds and wet areas in perineal area daily and after baths and                           all bowel movements.                        DIABETES CARE:     Check blood sugar:     Fingerstick blood sugar AC and HS   Fingerstick blood sugar every 6 hours if unable to eat      Report CBG < 60 or > 400 to physician.                                          Insulin Sliding Scale          Glucose  Novolog Insulin Subcutaneous        0 - 60   Orange juice or glucose tablet, hold insulin      No insulin   201-250  2 units   251-300  4 units   301-350  6 units   351-400  8 units   >400   10 units then call physician      Medications: Discontinue all previous medication orders, if any. See new list below.   Myah Apodaca   Home Medication Instructions ANA MARIA:[id number]    Printed on:03/31/17 5907   Medication Information                      amlodipine (NORVASC) 10 MG tablet  Take 10 mg by mouth once daily.             amoxicillin-clavulanate 500-125mg (AUGMENTIN) 500-125 mg Tab  Take 1 tablet (500 mg total) by mouth 2 (two) times daily. End date 4/5/17             aspirin 81 MG Chew  Take 1 tablet (81 mg total) by mouth once daily.             atenolol (TENORMIN) 50 MG tablet  Take 50 mg by mouth once daily.             CALCIUM CARBONATE/VITAMIN D3 (VITAMIN D-3 ORAL)  Take 50,000 Units by mouth.             colestipol (COLESTID) 5 gram granules  Take 1 g by mouth once daily.             glipiZIDE (GLUCOTROL) 5 MG TR24  Take 5 mg by mouth daily with breakfast.             guaifenesin 100 mg/5 ml (ROBITUSSIN) 100 mg/5 mL syrup  Take 200 mg by mouth 3 (three) times daily as needed for Cough.             insulin  lispro (HUMALOG) 100 unit/mL injection  Inject into the skin 3 (three) times daily before meals. SLIDING SCALE: < 60 GIVE 1 AMP GLUCAGON AND NOTIFY MD,  NONE, 150-200: 4 UNITS, 201-250: 6 UNITS, 251-300: 8 UNITS, >300: 10 UNITS AND NOTIFY MD             levothyroxine (SYNTHROID) 75 MCG tablet  Take 75 mcg by mouth once daily.             loperamide (IMODIUM) 2 mg capsule  Take 2 mg by mouth 4 (four) times daily as needed for Diarrhea.             ondansetron (ZOFRAN) 4 MG tablet  Take 4 mg by mouth every 8 (eight) hours as needed for Nausea.             pravastatin (PRAVACHOL) 20 MG tablet  Take 20 mg by mouth once daily.             SITagliptan (JANUVIA) 50 MG Tab  Take 100 mg by mouth once daily.             tramadol (ULTRAM) 50 mg tablet  Take 1 tablet (50 mg total) by mouth 3 (three) times daily as needed for Pain.             triamcinolone acetonide 0.1% (KENALOG) 0.1 % cream  Apply topically 2 (two) times daily.                 _________________________________  Rosibel Li MD  03/31/2017

## 2017-03-31 NOTE — PT/OT/SLP EVAL
Speech Language Pathology Evaluation  Discharge    Myah Apodaca   MRN: [id number]   Admitting Diagnosis: Facial droop    Diet recommendations: Solid Diet Level: Puree  Liquid Diet Level: Thin Feed only when awake/alert, HOB to 90 degrees, Small bites/sips and 1 bite/sip at a time, pt safe to advance back to dental soft and thin as pain decreases.  Pt may benefit from nutritional supplement 2/2 limited oral intake related to jaw pain.    SLP Treatment Date: 17  Speech Start Time: 805     Speech Stop Time: 822     Speech Total (min): 17 min       TREATMENT BILLABLE MINUTES:  Eval 9  and Treatment Swallowing Dysfunction 8    Diagnosis: Facial droop      Past Medical History:   Diagnosis Date    Aphasia     Arthritis     Hypertension     Renal disorder     Thyroid disease      History reviewed. No pertinent surgical history.    Has the patient been evaluated by SLP for swallowing? : Yes  Keep patient NPO?: No   General Precautions: Standard, aspiration, fall          Social Hx: Patient lives in NH.    Subjective:  Pt alert, cooperative, reports at baseline  Patient goals: to decrease jaw pain.    Pain Ratin/10              Pain Rating Post-Intervention: 0/10    Objective:   Patient found with: peripheral IV, telemetry, SCD    Oral Musculature Evaluation  Dentition: scattered dentition (upper dentures in place, lower dentures not present)  Mucosal Quality: adequate  Oral Labial Strength and Mobility: impaired retraction, functional retraction  Lingual Strength and Mobility: WFL  Buccal Strength and Mobility:  (mild droop on L)  Volitional Cough: WFL  Volitional Swallow: WFL  Voice Prior to PO Intake: clear  Oral Musculature Comments: mild jaw discomfort when swallowing     Cognitive Status:  Behavioral Observations: alert and cooperative-  Memory and Orientation: Pt Ox3 except date, able to recall month though not date or year.  Immediate recall WFL.  Pt with fair-good recall of recent events.  0/3  unassociated words recalled after delay  Attention: fair.  Problem Solving: Pt able to complete verbal problem solving accurately  Pragmatics: decreased eye contact likely 2/2 visual impairment (pt reports poor vision in R eye)  Executive Function: mental flexibility    Language: WFL    Auditory Comprehension: WFL    Verbal Expression: WFL    Motor Speech: WFL    Voice: WFL     Reading: WFL for large print sentences    Writing: uses dominant hand, WFL     Visual-Spatial: WFL for simple scanning    Additional Treatment:  Pt observed self feeding breakfast tray with thin liquids via straw with no overt s/s aspiration.  Pt avoiding all soft solids 2/2 fear of pain.  She denied discomfort with thin and puree.  When questioned re: change in puree, pt in agreement.  Swallow precs reviewed.  White board updated.     FIM:  Social Interaction: 6 Complete [facility]--The patient interacts appropriately with staff, other patients, and family members (e.g., controls temper, accepts criticism, is aware that words and actions have an impact on others), and does not require medication for   Problem Solvin Modified [facility]--In most situations, the patient recognizes a present problem, and with only mild difficulty makes appropriate decisions, initiates and carries out a sequence of steps to solve complex problems, or requires more than a   Comprehension: 6 Modified [facility]--In most situations, the patient understands readily or with only mild dificulty complex or abstract directions and conversation.  The patient does not require prompting, though (s)he may require a hearing or visual aid, other x   Expression: 6 Modified [facility]--In most situations, the patient expresses complex or abstract ideas relatively clearly or with only jackie difficulty.  The patient does not need any prompting, but (s)he may require an augmentative communication device or system.   Memory: 3 Moderate Prompting--The patient recognizes  and remembers 50 to 74% of the time.     Assessment:  Myah Apodaca is a 89 y.o. female with a SLP diagnosis of baseline speech, language, cognition WFL.  Oropharyngeal phases of swallow are WFL though pt requesting only pureed foods 2/2 jaw pain.  Pt safe to advance back to soft solids as desired.     Do you have any cultural, spiritual, Hindu conflicts, given your current situation?: none    Discharge recommendations: Discharge Facility/Level Of Care Needs: intermedicate care facility/nursing home     Goals:   SLP Goals     Not on file      Multidisciplinary Problems (Resolved)        Problem: SLP Goal    Goal Priority Disciplines Outcome   SLP Goal   (Resolved)     SLP Outcome(s) achieved   Description:  Speech Language Pathology Goals  Goals expected to be met by 4/6  1. Pt will tolerate dental soft diet with thin liquids without overt s/s aspiration.  2. Pt will complete speech, language, cognitive evaluation.   -goals met                  Plan:   Patient to be seen Therapy Frequency: 5 x/week   Plan of Care expires: 04/29/17  Plan of Care reviewed with: patient  SLP Follow-up?: No              JORDAN Johnson, CCC-SLP  03/31/2017

## 2017-03-31 NOTE — PROGRESS NOTES
[facility]  Vascular Neurology  Comprehensive Stroke Center  Progress Note      Neurologic Chief Complaint: L jaw pain    Subjective:     Interval History: Patient is seen for follow-up neurological assessment and treatment recommendations: VIVIAN. Patient stepped down from [facility]. Awaiting ENT consult. Jaw pain improved today; facial asymmetry improved.     HPI, Past Medical, Family, and Social History remains the same as documented in the initial encounter.     Review of Systems   Constitutional: Negative for chills and fever.   HENT:        Left jaw pain, patient also reports gum pain with eating   Eyes: Negative for visual disturbance.   Respiratory: Negative for cough and shortness of breath.    Cardiovascular: Negative for chest pain.   Gastrointestinal: Negative for abdominal pain and nausea.   Musculoskeletal: Positive for gait problem.   Neurological: Positive for weakness. Negative for headaches.   Psychiatric/Behavioral: Positive for confusion.     Scheduled Meds:   amlodipine  10 mg Oral Daily    aspirin  81 mg Oral Daily    atenolol  50 mg Oral Daily    cefTRIAXone (ROCEPHIN) IVPB  1 g Intravenous Q24H    heparin (porcine)  5,000 Units Subcutaneous Q8H    levothyroxine  75 mcg Oral Daily    lidocaine HCL 10 mg/ml (1%)  2.1 mL Intramuscular Once    pravastatin  20 mg Oral Daily    triamcinolone acetonide 0.1%   Topical (Top) BID     Continuous Infusions:   PRN Meds:dextrose 50%, dextrose 50%, glucagon (human recombinant), glucose, glucose, guaifenesin 100 mg/5 ml, insulin aspart, loperamide, ondansetron    Objective:     Vital Signs (Most Recent):  Temp: 99.4 °F (37.4 °C) (03/31/17 0700)  Pulse: 83 (03/31/17 0700)  Resp: 18 (03/31/17 0700)  BP: 138/63 (03/31/17 0700)  SpO2: 97 % (03/31/17 0700)  BP Location: Right arm    Vital Signs Range (Last 24H):  Temp:  [97.8 °F (36.6 °C)-100.3 °F (37.9 °C)]   Pulse:  [70-83]   Resp:  [17-19]   BP: (121-152)/(58-78)   SpO2:  [93 %-97 %]   BP  Location: Right arm    Physical Exam   Constitutional: She is oriented to person, place, and time. She appears well-developed and well-nourished.   HENT:   Head: Normocephalic and atraumatic.   Eyes: EOM are normal. Pupils are equal, round, and reactive to light.   Neck: Normal range of motion. Neck supple.   Cardiovascular: Normal rate and regular rhythm.    Pulmonary/Chest: Effort normal and breath sounds normal. No respiratory distress. She has no wheezes.   Musculoskeletal:   Some limitation of RUE 2/2 rotator cuff injury   Neurological: She is alert and oriented to person, place, and time.   Skin: Skin is warm and dry.   Psychiatric: She has a normal mood and affect.   Vitals reviewed.      Neurological Exam:   LOC: alert and follows requests  Language: No aphasia  Speech: No dysarthria  Orientation: Person, Place, Time  Visual Fields (CN II): Full  EOM (CN III, IV, VI): Full/intact  Pupils (CN III, IV, VI): PERRL  Facial Sensation (CN V): pain left lower face. Otherwise intact  Facial Movement (CN VII): lower weakness left lower  Tongue (CN XII): to midline  Motor*: Arm Left:  Paretic:  4/5, Leg Left:   Paretic:  4/5, Arm Right:   Paretic:  4/5, Leg Right:   Paretic:  4/5  Sensation: intact to light touch    NIH Stroke Scale:    Level of Consciousness: 0 - alert  LOC Questions: 0 - answers both correctly  LOC Commands: 0 - performs both correctly  Best Gaze: 0 - normal  Visual: 0 - no visual loss  Facial Palsy: 1 - minor  Motor Left Arm: 0 - no drift  Motor Right Arm: 0 - no drift  Motor Left Le - no drift  Motor Right Le - no drift  Limb Ataxia: 0 - absent  Sensory: 0 - normal  Best Language: 0 - no aphasia  Dysarthria: 0 - normal articulation  Extinction and Inattention: 0 - no neglect  NIH Stroke Scale Total: 1      Laboratory:  CMP:     Recent Labs  Lab 17  0803   CALCIUM 8.8      K 4.6   CO2 21*      BUN 38*   CREATININE 2.2*     BMP:     Recent Labs  Lab 17  0803       K 4.6      CO2 21*   BUN 38*   CREATININE 2.2*   CALCIUM 8.8     CBC:     Recent Labs  Lab 03/31/17  0803   WBC 11.45   RBC 3.15*   HGB 9.3*   HCT 29.2*      MCV 93   MCH 29.5   MCHC 31.8*     Lipid Panel:     Recent Labs  Lab 03/29/17  1616   CHOL 131   LDLCALC 79.4   HDL 36*   TRIG 78     Coagulation:     Recent Labs  Lab 03/29/17  0203   INR 1.0     Platelet Aggregation Study: No results for input(s): PLTAGG, PLTAGINTERP, PLTAGREGLACO, ADPPLTAGGREG in the last 168 hours.  Hgb A1C:     Recent Labs  Lab 03/29/17  0549   HGBA1C 6.6*     TSH: No results for input(s): TSH in the last 168 hours.    Diagnostic Results:  I have personally reviewed: CT Head. Date: 3/29/17 and MRI Head. Date: 3/29/17  Findings: No acute intracranial abnormality     Carotid US 1-39% bilaterally    MRI 3/29: no acute infarction. 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        Assessment/Plan:     89 y/o female with left facial droop, dysarthira and jaw pain. Received IVtPA at [facility]. Has UTI and elevated kidney function so this could be contributing to symptoms.   MRI negative for stroke. CT maxillofacial with 4mm calcification of L submandibular duct suggesting sialolithiasis. Symptoms not consistent with stroke. It is likely ENT issue. Patient has been stepped down however to stroke service solely because she received tPA. Awaiting formal ENT consult and if stable from their perspective will likely discharge patient back to facility vs transfer to medicine.       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 Coli pending susceptibilites  -Cont rocephin until cx finalized    Sialolithiasis of submandibular gland  - ENT consult placed  - change diet to pureed per patient request  - ENT consult placed  - will attempt to find sour candy for patient to suck on      Rosibel Li MD  Comprehensive Stroke Center  Department of Vascular Neurology   [facility]

## 2017-03-31 NOTE — DISCHARGE SUMMARY
[facility]  Vascular Neurology  Comprehensive Stroke Center  Discharge Summary     Summary:     Admit Date: 3/29/2017  4:23 AM    Discharge Date and Time: 3/31/17    Attending Physician: Lonny Escobedo MD     Discharge Provider: Rosibel Li MD    History of Present Illness: 89 y/o female who resides at [facility] in [place] started with left facial droop and jaw pain and dysarthria. She was taken to [facility], where Telemedicine call with Dr Jewell was done and with neg CT scan recommendation was for IVtPA to be given. Patient received was administered tPa and transferred to [facility] for vascular neuro evaluation.   Upon arrival patient still with dysarthria and left facial droop and pain to jaw area. Patient states that she has bottom dentures but did not put them in as it hurt too much and left face was full.   She denied any extremity weakness, sensory deficit, visual deficit.   Risk factors HTN, HLD, DM.      Hospital Course (synopsis of major diagnoses, care, treatment, and services provided during the course of the hospital stay): 89 y/o female with left facial droop, dysarthira and jaw pain. Received IVtPA at [facility] and transferred to [facility]. Patient was admitted to [facility], underwent MRI, which was negative for acute infarction, did show mild/moderate degree of  chronic microvascular ischemic change and small size remote bilateral centrum semiovale and cerebellar infarcts. Patient was also found to have UTI at time of admit, which was likely contributing to her symptoms. CT maxillofacial performed as patient was complaining of severe jaw pain and showed 4mm calcification of L submandibular duct suggesting sialolithiasis. It was felt that her symptoms at the time of admission were not due to stroke but likely due to combination of sialolithiasis and UTI.  Patient was  stepped down to vascular neuro, however this was solely because she received tPA. Her  neuro exam remained stable throughout her admission. She worked with PT/OT/SLP. She is discharged back to her NH.    NIH Stroke Scale:    Level of Consciousness: 0 - alert  LOC Questions: 0 - answers both correctly  LOC Commands: 0 - performs both correctly  Best Gaze: 0 - normal  Visual: 0 - no visual loss  Facial Palsy: 1 - minor  Motor Left Arm: 0 - no drift  Motor Right Arm: 0 - no drift  Motor Left Le - no drift  Motor Right Le - no drift  Limb Ataxia: 0 - absent  Sensory: 0 - normal  Best Language: 0 - no aphasia  Dysarthria: 0 - normal articulation  Extinction and Inattention: 0 - no neglect  NIH Stroke Scale Total: 1        Assessment/Plan:     Interventions: IV t-PA    Complications: None    Research Candidate?:  No--> no stroke    Neurological deficit at discharge: 4/5 weakness in all extremities; remains with left facial assymmetry     Disposition:     Final Active Diagnoses:    Diagnosis Date Noted POA    PRINCIPAL PROBLEM:  Facial droop [R29.810]  Yes    (HFpEF) heart failure with preserved ejection fraction [I50.30] 2017 Yes    Sialolithiasis of submandibular gland [K11.5] 2017 Yes    Cerebrovascular accident (CVA) [I63.9] 2017 Yes    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 [E10.22, N18.3] 2017 Yes    Controlled type 2 diabetes mellitus with stage 3 chronic kidney disease [E11.22, N18.3] 2017 Yes    Hypothyroidism [E03.9] 2017 Yes    Hypertension [I10] 2017 Yes    Osteoarthritis [M19.90] 2017 Yes    Hypercholesterolemia [E78.00] 2017 Yes    Acute cystitis without hematuria [N30.00] 2017 Yes      Problems Resolved During this Admission:    Diagnosis Date Noted Date Resolved POA     * Facial droop  - Unlikely due to stroke/TIA; more likely differential is ENT/jaw issue with UTI  - ENT consulted for sialolithiasis  - UTI treated with rocephin    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 Coli   - rocephin in patient  - discharge on augmentin as was not sensitive to rocephin    Sialolithiasis of submandibular gland  - ENT consulted  - change diet to pureed per patient request  - patient to suck on sour candy Or mariluz       Recommendations:     Post-discharge complication risks: Falls    Stroke Education given to: patient    Follow-up in Stroke Clinic in 28 days    Discharge Plan:  Antithrombotics: Aspirin 81 mg  Statin: pravavastatin 20 mg  Aggresive risk factor modification:  Hypertension, Diabetes and High Cholesterol    Follow Up:    Patient Instructions:   No discharge procedures on file.  Medications:  Reconciled Home Medications:    Myah Apodaca   Home Medication Instructions ANA MARIA:[id number]    Printed on:03/31/17 1245   Medication Information                      amlodipine (NORVASC) 10 MG tablet  Take 10 mg by mouth once daily.             amoxicillin-clavulanate 500-125mg (AUGMENTIN) 500-125 mg Tab  Take 1 tablet (500 mg total) by mouth 2 (two) times daily.              aspirin 81 MG Chew  Take 1 tablet (81 mg total) by mouth once daily.             atenolol (TENORMIN) 50 MG tablet  Take 50 mg by mouth once daily.             CALCIUM CARBONATE/VITAMIN D3 (VITAMIN D-3 ORAL)  Take 50,000 Units by mouth.             colestipol (COLESTID) 5 gram granules  Take 1 g by mouth once daily.             glipiZIDE (GLUCOTROL) 5 MG TR24  Take 5 mg by mouth daily with breakfast.             guaifenesin 100 mg/5 ml (ROBITUSSIN) 100 mg/5 mL syrup  Take 200 mg  by mouth 3 (three) times daily as needed for Cough.             insulin lispro (HUMALOG) 100 unit/mL injection  Inject into the skin 3 (three) times daily before meals. SLIDING SCALE: < 60 GIVE 1 AMP GLUCAGON AND NOTIFY MD,  NONE, 150-200: 4 UNITS, 201-250: 6 UNITS, 251-300: 8 UNITS, >300: 10 UNITS AND NOTIFY MD             levothyroxine (SYNTHROID) 75 MCG tablet  Take 75 mcg by mouth once daily.             loperamide (IMODIUM) 2 mg capsule  Take 2 mg by mouth 4 (four) times daily as needed for Diarrhea.             ondansetron (ZOFRAN) 4 MG tablet  Take 4 mg by mouth every 8 (eight) hours as needed for Nausea.             pravastatin (PRAVACHOL) 20 MG tablet  Take 20 mg by mouth once daily.             SITagliptan (JANUVIA) 50 MG Tab  Take 100 mg by mouth once daily.             tramadol (ULTRAM) 50 mg tablet  Take 1 tablet (50 mg total) by mouth 3 (three) times daily as needed for Pain.             triamcinolone acetonide 0.1% (KENALOG) 0.1 % cream  Apply topically 2 (two) times daily.               Rosibel Li MD  Comprehensive Stroke Center  Department of Vascular Neurology   [facility]

## 2017-03-31 NOTE — PLAN OF CARE
[facility], made aware pt is ready to return to facility.  Orders have been faxed and received by Cande at facility.  Will await return call for further instructions on transport time and report.    Johana Thompson RN  Case Management  [phone]

## 2017-03-31 NOTE — ASSESSMENT & PLAN NOTE
- Unlikely due to stroke/TIA; more likely differential is ENT/jaw issue with UTI  - ENT consulted for sialolithiasis  - UTI treated with rocephin

## 2017-03-31 NOTE — PLAN OF CARE
Problem: Patient Care Overview  Goal: Plan of Care Review  Outcome: Ongoing (interventions implemented as appropriate)  Plan of care reviewed with pt at bedside. Safety precautions initiated. Bed locked in lowest position, call bell within reach, bed alarm on. AAOX3, disoriented to time occasionally.VSS. Will continue to monitor.

## 2017-03-31 NOTE — PT/OT/SLP EVAL
"Physical Therapy  Evaluation    Myah Apodaca   MRN: [id number]   Admitting Diagnosis: Facial droop    PT Received On: 17  PT Start Time: 841     PT Stop Time: 852    PT Total Time (min): 11 min       Billable Minutes:  Evaluation 11    Diagnosis: Facial droop  S/p tPA    Past Medical History:   Diagnosis Date    Aphasia     Arthritis     Hypertension     Renal disorder     Thyroid disease       History reviewed. No pertinent surgical history.    Referring physician: Rosibel Li MD  Date referred to PT: 17      General Precautions: Standard, aspiration, fall  Orthopedic Precautions: N/A   Braces: N/A       Do you have any cultural, spiritual, Latter-day conflicts, given your current situation?: none stated    Patient History:  Living Environment Comment: Pt lives in a NH in [place]. PTA, pt reports she is primarily in w/c throughout the day and staff assists her with transfers. Pt is able to propel w/c with B UE's. She performs all ADLs and self-care (I)'ly. All DME available.   Equipment Currently Used at Home: wheelchair      Previous Level of Function:  Ambulation Skills: unable to perform  Transfer Skills: unable to perform  ADL Skills: independent    Subjective:  Communicated with RN prior to session.  Pt agreeable to PT evaluation.     "I don't walk and I'm not going to run. I'm 89, and no 89 year old should be walking or running. I don't want to fall."     Chief Complaint: None stated  Patient goals: To go back to NH and return to PLOF    Pain Ratin/10   Pain Rating Post-Intervention: 0/10    Objective:   Patient found with: peripheral IV, telemetry, SCD     Cognitive Exam:  Oriented to: Person, Place, Time and Situation    Follows Commands/attention: Follows multistep  commands  Communication: clear/fluent  Safety awareness/insight to disability: intact    Physical Exam:  Postural examination/scapula alignment: Rounded shoulder, Head forward, Posterior pelvic tilt and " Kyphosis    Skin integrity: Visible skin intact, Thin and Dry  Edema: None noted     Sensation:   Intact    Upper Extremity Range of Motion:  Right Upper Extremity: WFL  Left Upper Extremity: WFL    Upper Extremity Strength:  Right Upper Extremity: WFL except shoulder strength 2/2 prior weakness  Left Upper Extremity: WFL    Lower Extremity Range of Motion:  Right Lower Extremity: WFL  Left Lower Extremity: WFL    Lower Extremity Strength:  Right Lower Extremity: Hip flexion 3+/5; 4/5 grossly throughout  Left Lower Extremity: 3+/5 hip flexion; 4/5 grossly throughout     Fine motor coordination:  Intact    Gross motor coordination: WFL    Functional Mobility:    Bed Mobility:  Supine to Sit:  Stand-by Assistance HOB elevated  Sit to supine: Stand-by Assistance HOB elevated  Scooting: Minimal Assistance assist to EOB      Transfers:   Sit to stand:Moderate Assistance with B HHA   Required assist for trunk/hip extension; v/c to lift head up  Provided R knee blocking for stability & safety. No LOB or buckling noted.       Gait:   Attempted 2 side steps to the Left with Max A x2ppl. PT provided assist for balance and weight-shift while SPT assisted pt with advancing bilateral LE's. Pt demonstrated apprehension and fear of falling, but tolerated well overall.       Balance:   Static Sit: GOOD-: Takes MODERATE challenges from all directions but inconsistently  Dynamic Sit: GOOD-: Maintains balance through MODERATE excursions of active trunk movement,     Static Stand: POOR: Needs MODERATE assist to maintain  Dynamic stand: POOR: N/A    Education:  Education provided to pt regarding: PT role/POC; safety with mobility. Verbalized understanding.     Whiteboard updated with correct mobility information. RN/PCT notified.  Pt ok to transfer via squat/stand pivot with RN/PCT. Use 1 person assist.      AM-PAC 6 CLICK MOBILITY  How much help from another person does this patient currently need?   1 = Unable, Total/Dependent  Assistance  2 = A lot, Maximum/Moderate Assistance  3 = A little, Minimum/Contact Guard/Supervision  4 = None, Modified [place]/Independent    Turning over in bed (including adjusting bedclothes, sheets and blankets)?: 4  Sitting down on and standing up from a chair with arms (e.g., wheelchair, bedside commode, etc.): 2  Moving from lying on back to sitting on the side of the bed?: 3  Moving to and from a bed to a chair (including a wheelchair)?: 2  Need to walk in hospital room?: 1  Climbing 3-5 steps with a railing?: 1  Total Score: 13     AM-PAC Raw Score CMS G-Code Modifier Level of Impairment Assistance   6 % Total / Unable   7 - 9 CM 80 - 100% Maximal Assist   10 - 14 CL 60 - 80% Moderate Assist   15 - 19 CK 40 - 60% Moderate Assist   20 - 22 CJ 20 - 40% Minimal Assist   23 CI 1-20% SBA / CGA   24 CH 0% Independent/ Mod I     Patient left supine with all lines intact and call button in reach.    Assessment:   Myah Apodaca is a 89 y.o. female with a medical diagnosis of Facial droop and presents with decrease endurance, strength, and balance with mobility.  Pt tolerated session well with no increase in pain/discomfort. Pt demonstrates baseline mobility, but would benefit from continued skilled physical therapy while she remains in acute care to prevent any deconditioning & maintain mobility. PT recommends d/c disposition to return to NH.     Rehab identified problem list/impairments: Rehab identified problem list/impairments: weakness, impaired endurance, impaired functional mobilty, gait instability, impaired balance, visual deficits, decreased lower extremity function, decreased upper extremity function, decreased coordination    Rehab potential is good.    Activity tolerance: Good    Discharge recommendations: Discharge Facility/Level Of Care Needs: intermedicate care facility/nursing home (return to NH)     Barriers to discharge: Barriers to Discharge: None    Equipment recommendations:  Equipment Needed After Discharge: none     GOALS:   Physical Therapy Goals        Problem: Physical Therapy Goal    Goal Priority Disciplines Outcome Goal Variances Interventions   Physical Therapy Goal     PT/OT, PT      Description:  Goals to be met by: 17     Patient will increase functional independence with mobility by performin. Supine to sit with Modified [place]  2. Sit to supine with Modified [place]  3. Sit to stand transfer with Modified [place]  4. Bed to chair transfer with Moderate Assistance using appropriate transfer and/or AD.   5. Stand for 1 minutes with Maximum Assistance using appropriate AD for weight-bearing & balance.   6. Lower extremity exercise program x10 reps with assistance as needed                PLAN:    Patient to be seen 3 x/week to address the above listed problems via therapeutic activities, therapeutic exercises, neuromuscular re-education  Plan of Care expires: 17  Plan of Care reviewed with: patient    Functional Assessment Tool Used: ampac  Score: 13  Functional Limitation: Mobility: Walking and moving around  Mobility: Walking and Moving Around Current Status (): CL  Mobility: Walking and Moving Around Goal Status (): CL     Manjula Gurrola, PT  2017

## 2017-03-31 NOTE — PLAN OF CARE
Problem: Occupational Therapy Goal  Goal: Occupational Therapy Goal  Goals to be met by: 4/7/17     Patient will increase functional independence with ADLs by performing:    UE Dressing with Supervision.  LE Dressing with Minimal Assistance donning socks & pants.  Grooming while seated with Set-up Assistance.  Toileting from bedside commode with Minimal Assistance for hygiene and clothing management.   Rolling to Bilateral with Modified [place].   Supine to sit with Modified [place].  Stand pivot transfers with Stand-by Assistance.  Outcome: Ongoing (interventions implemented as appropriate)  OT eval completed.

## 2017-03-31 NOTE — ASSESSMENT & PLAN NOTE
Unlikely due to stroke/TIA; more likely differential is ENT/jaw issue vs metabolic (UTI with elevated Cr)

## 2017-04-01 NOTE — PLAN OF CARE
Problem: Occupational Therapy Goal  Goal: Occupational Therapy Goal  Goals to be met by: 4/7/17     Patient will increase functional independence with ADLs by performing:    UE Dressing with Supervision. - not met  LE Dressing with Minimal Assistance donning socks & pants. - not met  Grooming while seated with Set-up Assistance. - not met  Toileting from bedside commode with Minimal Assistance for hygiene and clothing management. - not met  Rolling to Bilateral with Modified [place]. - not met  Supine to sit with Modified [place]. - not met  Stand pivot transfers with Stand-by Assistance. - not met   Outcome: Outcome(s) achieved Date Met:  04/01/17  No goals met.  Hospital discharge.

## 2017-04-01 NOTE — PT/OT/SLP DISCHARGE
Occupational Therapy Discharge Summary    Myah Apodaca  MRN: [id number]   Facial droop   Patient Discharged from acute Occupational Therapy on 4/1/17.  Please refer to prior OT note dated on 3/31/17 for functional status.     Assessment:   Patient has not met goals.  GOALS:   Occupational Therapy Goals     Not on file      Multidisciplinary Problems (Resolved)        Problem: Occupational Therapy Goal    Goal Priority Disciplines Outcome Interventions   Occupational Therapy Goal   (Resolved)     OT, PT/OT Outcome(s) achieved    Description:  Goals to be met by: 4/7/17     Patient will increase functional independence with ADLs by performing:    UE Dressing with Supervision. - not met  LE Dressing with Minimal Assistance donning socks & pants. - not met  Grooming while seated with Set-up Assistance. - not met  Toileting from bedside commode with Minimal Assistance for hygiene and clothing management. - not met  Rolling to Bilateral with Modified [place]. - not met  Supine to sit with Modified [place]. - not met  Stand pivot transfers with Stand-by Assistance. - not met               Reasons for Discontinuation of Therapy Services  Transfer to alternate level of care.      Plan:  Patient Discharged to: Nursing home.    CALVIN Christensen 4/1/2017

## 2017-04-02 NOTE — PT/OT/SLP DISCHARGE
Physical Therapy Discharge Summary    Myah Apodaca  MRN: [id number]   Facial droop       Patient Discharged from acute Physical Therapy on 17.  Please refer to prior PT noted date on 3/31/17 for functional status.     Assessment:   Patient was discharge unexpectedly.  Information required to complete and accurate discharge summary is unknown.  Refer to therapy initial evaluation and last progress note for initial and most recent functional status and goal achievement.  Recommendations made may be found in medical record.       GOALS:   Physical Therapy Goals        Problem: Physical Therapy Goal    Goal Priority Disciplines Outcome Goal Variances Interventions   Physical Therapy Goal     PT/OT, PT      Description:  Goals to be met by: 17     Patient will increase functional independence with mobility by performin. Supine to sit with Modified [place]  2. Sit to supine with Modified [place]  3. Sit to stand transfer with Modified [place]  4. Bed to chair transfer with Moderate Assistance using appropriate transfer and/or AD.   5. Stand for 1 minutes with Maximum Assistance using appropriate AD for weight-bearing & balance.   6. Lower extremity exercise program x10 reps with assistance as needed              Reasons for Discontinuation of Therapy Services  Transfer to alternate level of care.      Plan:  Patient Discharged to: Nursing Home.    Manjula Gurrola, PT, DPT  2017

## 2018-04-03 PROBLEM — D64.9 ANEMIA: Status: ACTIVE | Noted: 2018-01-01

## 2018-04-03 PROBLEM — I50.32 DIASTOLIC CHF, CHRONIC: Status: ACTIVE | Noted: 2017-03-31

## 2018-04-03 PROBLEM — J40 BRONCHITIS: Status: ACTIVE | Noted: 2018-01-01

## 2018-04-04 PROBLEM — E63.9 INADEQUATE DIETARY ENERGY INTAKE: Status: ACTIVE | Noted: 2018-01-01

## 2018-04-04 PROBLEM — J18.9 PNEUMONIA: Status: ACTIVE | Noted: 2018-01-01

## 2018-04-06 PROBLEM — J45.909 REACTIVE AIRWAY DISEASE: Status: ACTIVE | Noted: 2018-01-01

## 2018-06-19 PROBLEM — E86.0 DEHYDRATION: Status: ACTIVE | Noted: 2018-01-01

## 2018-06-19 PROBLEM — A41.9 SEPSIS: Status: ACTIVE | Noted: 2018-01-01

## 2018-06-19 PROBLEM — E11.9 DIABETES: Status: ACTIVE | Noted: 2018-01-01

## 2018-06-20 PROBLEM — N18.30 ACUTE RENAL FAILURE SUPERIMPOSED ON STAGE 3 CHRONIC KIDNEY DISEASE: Status: ACTIVE | Noted: 2018-01-01

## 2018-06-20 PROBLEM — E11.21 DIABETIC NEPHROPATHY ASSOCIATED WITH TYPE 2 DIABETES MELLITUS: Status: ACTIVE | Noted: 2018-01-01

## 2018-06-20 PROBLEM — E83.51 HYPOCALCEMIA: Status: ACTIVE | Noted: 2018-01-01

## 2018-06-20 PROBLEM — N17.9 AKI (ACUTE KIDNEY INJURY): Status: ACTIVE | Noted: 2018-01-01

## 2018-06-20 PROBLEM — R65.20 SEVERE SEPSIS: Status: ACTIVE | Noted: 2018-01-01

## 2018-06-20 PROBLEM — I12.9 HYPERTENSIVE KIDNEY DISEASE WITH CHRONIC KIDNEY DISEASE STAGE IV: Status: ACTIVE | Noted: 2018-01-01

## 2018-06-20 PROBLEM — I95.9 HYPOTENSION: Status: ACTIVE | Noted: 2018-01-01

## 2018-06-20 PROBLEM — N18.4 HYPERTENSIVE KIDNEY DISEASE WITH CHRONIC KIDNEY DISEASE STAGE IV: Status: ACTIVE | Noted: 2018-01-01

## 2018-06-23 PROBLEM — E87.20 METABOLIC ACIDOSIS: Status: ACTIVE | Noted: 2018-01-01

## 2021-06-11 NOTE — SUBJECTIVE & OBJECTIVE
Past Medical History:   Diagnosis Date    Aphasia     Arthritis     Hypertension     Renal disorder     Thyroid disease      No past surgical history on file.  No family history on file.  Social History   Substance Use Topics    Smoking status: Never Smoker    Smokeless tobacco: None    Alcohol use No     Review of patient's allergies indicates:  No Known Allergies  Medications: I have reviewed the current medication administration record.      (Not in a hospital admission)    Review of Systems   Constitutional: Negative for chills and fever.   HENT: Negative for ear discharge and ear pain.         Haw pain   Eyes: Negative for pain and redness.   Respiratory: Negative for cough and shortness of breath.    Gastrointestinal: Negative for abdominal distention and constipation.   Endocrine: Positive for cold intolerance. Negative for polyuria.   Genitourinary: Negative for difficulty urinating and dysuria.   Musculoskeletal: Positive for arthralgias. Negative for back pain.   Skin: Negative for rash and wound.        Dry skin   Neurological: Positive for facial asymmetry and speech difficulty.   Psychiatric/Behavioral: Negative for agitation and confusion.     Objective:     Vital Signs (Most Recent):  Temp: 98 °F (36.7 °C) (03/29/17 0421)  Pulse: 90 (03/29/17 0438)  Resp: 16 (03/29/17 0438)  BP: (!) 143/63 (03/29/17 0438)  SpO2: 100 % (03/29/17 0438)    Vital Signs Range (Last 24H):  Temp:  [98 °F (36.7 °C)]   Pulse:  [86-92]   Resp:  [16-20]   BP: (143-180)/(63-83)   SpO2:  [96 %-100 %]     Physical Exam   Constitutional: She is oriented to person, place, and time. She appears well-developed and well-nourished.   HENT:   Head: Normocephalic and atraumatic.   Missing bottom dentures   Eyes: EOM are normal. Pupils are equal, round, and reactive to light.   Neck: Normal range of motion.   Cardiovascular: Normal rate.    Pulmonary/Chest: Effort normal.   Abdominal: Soft.   Genitourinary:   Genitourinary      Caller: RADHA MANN    Comments: Cloud urine in catheter   Musculoskeletal: Normal range of motion.   Neurological: She is alert and oriented to person, place, and time. GCS eye subscore is 4 - spontaneous. GCS verbal subscore is 5 - oriented. GCS motor subscore is 6 - obeys commands.   Skin: Skin is warm and dry.   Tenting of skin       Neurological Exam:   LOC: alert and follows requests  Language: No aphasia  Speech: Dysarthria  Orientation: Person, Place, Time  Memory: Recent memory intact, Remote memory intact, Age correct, Month correct  Visual Fields (CN II): Full  EOM (CN III, IV, VI): Full/intact  Oculocephalics: normal  Pupils (CN III, IV, VI): PERRL  Facial Sensation (CN V): Symmetric, Corneal reflex intact  Facial Movement (CN VII): lower weakness left lower  Hearing (CN VIII): intact bilaterally  Gag Reflex (CN IX, X): normal/symmetric  Shoulder/Neck (CN XI): SCM-Left: Normal ; SCM-Right: Normal ; Shoulder Shrug: Normal/Symetric  Tongue (CN XII): to midline  Reflexes: flexor plantar responses bilaterally  Motor*: Arm Left:  Normal (5/5), Leg Left:   Normal (5/5), Arm Right:   Normal (5/5), Leg Right:   Normal (5/5)  Cerebellar*: Not testable due to laying on stretcher post tpa  Sensation: intact to light touch, temperature and vibration  Tone: Arm-Left: normal; Leg-Left: normal; Arm-Right: normal; Leg-Right: normal    Stroke Team Times:   Date and Time Taken: 3/29/2017 5:19 AM  Last Known Normal Date and Time : 3/29/932063:00  Symptom Onset Date and Time:3/29/056422:00  Stroke Team Called Date and Time: 3/29/939143:31  Stroke Team Arrived Date and Time: 3/29/2017  CT Interpretation Time: 02:31  Intervention Time: 02:53 (TPA)  NIH Stroke Scale:  Interval: 1 hour post tPA or endovascular procedure completion  Level of Consciousness: 0 - alert  LOC Questions: 0 - answers both correctly  LOC Commands: 0 - performs both correctly  Best Gaze: 0 - normal  Visual: 0 - no visual loss  Facial Palsy: 1 - minor  Motor Left Arm: 0 - no  drift  Motor Right Arm: 0 - no drift  Motor Left Le - no drift  Motor Right Le - no drift  Limb Ataxia: 0 - absent  Sensory: 0 - normal  Best Language: 0 - no aphasia  Dysarthria: 1 - mild to moderate dysarthria  Extinction and Inattention: 0 - no neglect  NIH Stroke Scale Total: 2  Pierson Coma Scale:  Best Eye Response: 4 - spontaneous  Best Motor Response: 6 - obeys commands  Best Verbal Response: 5 - oriented  Pierson Coma Scale Total: 15  Modified [place] Scale:   Timeline: Prior to symptoms onset  Modified [place] Score: 2 - slight disability        Laboratory:  CMP:   Recent Labs  Lab 17  0203   CALCIUM 9.2   ALBUMIN 3.7   PROT 7.1      K 4.7   CO2 23      BUN 47*   CREATININE 2.10*   ALKPHOS 83   ALT 18   AST 16   BILITOT 0.5     CBC:   Recent Labs  Lab 17  0203   WBC 12.50   RBC 3.17*   HGB 9.6*   HCT 29.3*      MCV 92   MCH 30.2   MCHC 32.7     Lipid Panel: No results for input(s): CHOL, LDLCALC, HDL, TRIG in the last 168 hours.  Coagulation:   Recent Labs  Lab 17  0203   INR 1.0     Platelet Aggregation Study: No results for input(s): PLTAGG, PLTAGINTERP, PLTAGREGLACO, ADPPLTAGGREG in the last 168 hours.  Hgb A1C: No results for input(s): HGBA1C in the last 168 hours.  TSH: No results for input(s): TSH in the last 168 hours.     Recent Labs  Lab 17  0443   COLORU [place]   SPECGRAV 1.010   PHUR 5.0   PROTEINUA 1+*   BACTERIA Many*         Diagnostic Results:  Brain Imaging:  CT head w/o contrast 3-29-17 results:  No hemmorhage. No mass effect. No early infarct signs    Cardiac Evaluation:  EKG/Telemetry: